# Patient Record
Sex: FEMALE | Race: BLACK OR AFRICAN AMERICAN
[De-identification: names, ages, dates, MRNs, and addresses within clinical notes are randomized per-mention and may not be internally consistent; named-entity substitution may affect disease eponyms.]

---

## 2019-04-15 ENCOUNTER — HOSPITAL ENCOUNTER (INPATIENT)
Dept: HOSPITAL 61 - ER | Age: 84
LOS: 1 days | Discharge: HOME | DRG: 191 | End: 2019-04-16
Attending: INTERNAL MEDICINE | Admitting: INTERNAL MEDICINE
Payer: COMMERCIAL

## 2019-04-15 VITALS — DIASTOLIC BLOOD PRESSURE: 95 MMHG | SYSTOLIC BLOOD PRESSURE: 204 MMHG

## 2019-04-15 VITALS — DIASTOLIC BLOOD PRESSURE: 105 MMHG | SYSTOLIC BLOOD PRESSURE: 141 MMHG

## 2019-04-15 VITALS — HEIGHT: 63 IN | BODY MASS INDEX: 33.86 KG/M2 | WEIGHT: 191.12 LBS

## 2019-04-15 DIAGNOSIS — Z88.1: ICD-10-CM

## 2019-04-15 DIAGNOSIS — I45.10: ICD-10-CM

## 2019-04-15 DIAGNOSIS — I25.10: ICD-10-CM

## 2019-04-15 DIAGNOSIS — J44.1: Primary | ICD-10-CM

## 2019-04-15 DIAGNOSIS — I10: ICD-10-CM

## 2019-04-15 DIAGNOSIS — Z90.710: ICD-10-CM

## 2019-04-15 DIAGNOSIS — E44.0: ICD-10-CM

## 2019-04-15 DIAGNOSIS — E78.5: ICD-10-CM

## 2019-04-15 DIAGNOSIS — K44.9: ICD-10-CM

## 2019-04-15 DIAGNOSIS — Z88.8: ICD-10-CM

## 2019-04-15 DIAGNOSIS — K57.90: ICD-10-CM

## 2019-04-15 DIAGNOSIS — I25.2: ICD-10-CM

## 2019-04-15 DIAGNOSIS — M19.90: ICD-10-CM

## 2019-04-15 DIAGNOSIS — Z90.49: ICD-10-CM

## 2019-04-15 DIAGNOSIS — K21.9: ICD-10-CM

## 2019-04-15 LAB
ALBUMIN SERPL-MCNC: 3.3 G/DL (ref 3.4–5)
ALBUMIN/GLOB SERPL: 0.8 {RATIO} (ref 1–1.7)
ALP SERPL-CCNC: 60 U/L (ref 46–116)
ALT SERPL-CCNC: 11 U/L (ref 14–59)
ANION GAP SERPL CALC-SCNC: 8 MMOL/L (ref 6–14)
APTT PPP: YELLOW S
AST SERPL-CCNC: 22 U/L (ref 15–37)
BACTERIA #/AREA URNS HPF: 0 /HPF
BASOPHILS # BLD AUTO: 0.1 X10^3/UL (ref 0–0.2)
BASOPHILS NFR BLD: 1 % (ref 0–3)
BILIRUB SERPL-MCNC: 0.3 MG/DL (ref 0.2–1)
BILIRUB UR QL STRIP: NEGATIVE
BUN SERPL-MCNC: 19 MG/DL (ref 7–20)
BUN/CREAT SERPL: 19 (ref 6–20)
CALCIUM SERPL-MCNC: 9.2 MG/DL (ref 8.5–10.1)
CHLORIDE SERPL-SCNC: 103 MMOL/L (ref 98–107)
CO2 SERPL-SCNC: 29 MMOL/L (ref 21–32)
CREAT SERPL-MCNC: 1 MG/DL (ref 0.6–1)
EOSINOPHIL NFR BLD: 0.3 X10^3/UL (ref 0–0.7)
EOSINOPHIL NFR BLD: 5 % (ref 0–3)
ERYTHROCYTE [DISTWIDTH] IN BLOOD BY AUTOMATED COUNT: 14.4 % (ref 11.5–14.5)
FIBRINOGEN PPP-MCNC: CLEAR MG/DL
GFR SERPLBLD BASED ON 1.73 SQ M-ARVRAT: 63.5 ML/MIN
GLOBULIN SER-MCNC: 4.1 G/DL (ref 2.2–3.8)
GLUCOSE SERPL-MCNC: 75 MG/DL (ref 70–99)
HCT VFR BLD CALC: 37.9 % (ref 36–47)
HGB BLD-MCNC: 12 G/DL (ref 12–15.5)
INFLUENZA A PATIENT: NEGATIVE
INFLUENZA B PATIENT: NEGATIVE
LIPASE: 188 U/L (ref 73–393)
LYMPHOCYTES # BLD: 1.3 X10^3/UL (ref 1–4.8)
LYMPHOCYTES NFR BLD AUTO: 21 % (ref 24–48)
MCH RBC QN AUTO: 26 PG (ref 25–35)
MCHC RBC AUTO-ENTMCNC: 32 G/DL (ref 31–37)
MCV RBC AUTO: 83 FL (ref 79–100)
MONO #: 0.5 X10^3/UL (ref 0–1.1)
MONOCYTES NFR BLD: 8 % (ref 0–9)
NEUT #: 4.1 X10^3UL (ref 1.8–7.7)
NEUTROPHILS NFR BLD AUTO: 65 % (ref 31–73)
NITRITE UR QL STRIP: NEGATIVE
PH UR STRIP: 6.5 [PH]
PLATELET # BLD AUTO: 208 X10^3/UL (ref 140–400)
POTASSIUM SERPL-SCNC: 3.6 MMOL/L (ref 3.5–5.1)
PROT SERPL-MCNC: 7.4 G/DL (ref 6.4–8.2)
PROT UR STRIP-MCNC: NEGATIVE MG/DL
RBC # BLD AUTO: 4.56 X10^6/UL (ref 3.5–5.4)
RBC #/AREA URNS HPF: 0 /HPF (ref 0–2)
SODIUM SERPL-SCNC: 140 MMOL/L (ref 136–145)
SQUAMOUS #/AREA URNS LPF: (no result) /LPF
UROBILINOGEN UR-MCNC: 0.2 MG/DL
WBC # BLD AUTO: 6.4 X10^3/UL (ref 4–11)
WBC #/AREA URNS HPF: 0 /HPF (ref 0–4)

## 2019-04-15 PROCEDURE — 93005 ELECTROCARDIOGRAM TRACING: CPT

## 2019-04-15 PROCEDURE — 87804 INFLUENZA ASSAY W/OPTIC: CPT

## 2019-04-15 PROCEDURE — 85025 COMPLETE CBC W/AUTO DIFF WBC: CPT

## 2019-04-15 PROCEDURE — 71045 X-RAY EXAM CHEST 1 VIEW: CPT

## 2019-04-15 PROCEDURE — 83690 ASSAY OF LIPASE: CPT

## 2019-04-15 PROCEDURE — 84484 ASSAY OF TROPONIN QUANT: CPT

## 2019-04-15 PROCEDURE — G0379 DIRECT REFER HOSPITAL OBSERV: HCPCS

## 2019-04-15 PROCEDURE — 85379 FIBRIN DEGRADATION QUANT: CPT

## 2019-04-15 PROCEDURE — 83735 ASSAY OF MAGNESIUM: CPT

## 2019-04-15 PROCEDURE — 84443 ASSAY THYROID STIM HORMONE: CPT

## 2019-04-15 PROCEDURE — 94640 AIRWAY INHALATION TREATMENT: CPT

## 2019-04-15 PROCEDURE — 74177 CT ABD & PELVIS W/CONTRAST: CPT

## 2019-04-15 PROCEDURE — 36415 COLL VENOUS BLD VENIPUNCTURE: CPT

## 2019-04-15 PROCEDURE — 83880 ASSAY OF NATRIURETIC PEPTIDE: CPT

## 2019-04-15 PROCEDURE — 96374 THER/PROPH/DIAG INJ IV PUSH: CPT

## 2019-04-15 PROCEDURE — 80053 COMPREHEN METABOLIC PANEL: CPT

## 2019-04-15 PROCEDURE — 80061 LIPID PANEL: CPT

## 2019-04-15 PROCEDURE — 71275 CT ANGIOGRAPHY CHEST: CPT

## 2019-04-15 PROCEDURE — 81001 URINALYSIS AUTO W/SCOPE: CPT

## 2019-04-15 RX ADMIN — IPRATROPIUM BROMIDE AND ALBUTEROL SULFATE SCH ML: .5; 3 SOLUTION RESPIRATORY (INHALATION) at 20:12

## 2019-04-15 NOTE — EKG
[facility]

              [address] [place] [address]

Test Date:    2019-04-15               Test Time:    14:34:01

Pat Name:     MED ROWLAND          Department:   

Patient ID:   [id number]           Room:          

Gender:       F                        Technician:   

:          [id number]               Requested By: ALBERT BARBER

Order Number: [id number]           Reading MD:   Wellington Roe

                                 Measurements

Intervals                              Axis          

Rate:         75                       P:            41

MO:           148                      QRS:          -66

QRSD:         140                      T:            16

QT:           418                                    

QTc:          470                                    

                           Interpretive Statements

SINUS RHYTHM

ABNORMAL LEFT AXIS DEVIATION

LEFT ANTERIOR FASCICULAR BLOCK

RIGHT BUNDLE BRANCH BLOCK

ABNORMAL ECG



Electronically Signed On 2019 13:00:19 CDT by Wellington Roe

## 2019-04-15 NOTE — RAD
PORTABLE CHEST 1V

 

Clinical indications: Chest pain, wheezing.

 

COMPARISON: December 11, 2017.

 

Findings: No acute lung infiltrate or pleural effusion or pulmonary edema 

or lung mass or pneumothorax is seen.  The heart size, pulmonary 

vasculature, mediastinum and both yonny are unremarkable. 

 

Impression: No acute radiographic abnormality is seen.

 

Electronically signed by: Meño Kim MD (4/15/2019 3:11 PM) 

[id number]

## 2019-04-15 NOTE — NUR
The patient, MED ROWLAND, 88 y/o, F admitted by MARIE FRAUSTO MD, was given written 
information regarding hospital policies, unit procedures and contact persons. ORIENTED TO 
ROOM, POC, APPLIED CARDIAC MONITOR AND HER ACTIVITY. PT IS TO CALL FOR ASSISTANCE TO GET OUT 
OF BED. BOOKLET FOR ADMITS WAS GIVEN AND EXPLAINED MED PHAMPLET AND FOOD MENUE. 



Valuables were checked and DOCUMENTED IN THE EMR. LCRN .

## 2019-04-15 NOTE — PHYS DOC
Past Medical History


Past Medical History:  Arthritis, Asthma, Diverticulitis, GI Bleed, Hypertension

, Pneumonia


Additional Past Medical Histor:  HERNIA, rectal bleed 08/15


Past Surgical History:  Cholecystectomy, Hysterectomy, Tubal ligation


Additional Past Surgical Histo:  heart cath, renal stents


Alcohol Use:  None


Drug Use:  None





Adult General


Chief Complaint


Chief Complaint:  COUGH





HPI


HPI





Patient is a 87  year old female history of COPD and hypertension presents to 

the ED complaining of cough 3 months. Patient states that her cough has been 

getting worse. States she was told at  that she had COPD. States she has mild 

chest pain also. History of negative stress test in 2015 and had a previous MI 

years before that. Describes the pain as sharp. Rates the pain as 6 out of 10. 

Associates symptoms include nausea and subjective fever. Denies lower leg 

swelling, abdominal pain, vomiting, dizziness, weakness, headache, neck pain.





Review of Systems


Review of Systems





Constitutional: Complains of subjective fever. Denies chills []


Eyes: Denies change in visual acuity, redness, or eye pain []


HENT: Complains of congestion. Denies sore throat []


Respiratory: Complains of cough. Denies shortness of breath []


Cardiovascular: No additional information not addressed in HPI []


GI: Denies abdominal pain, nausea, vomiting, bloody stools or diarrhea []


: Denies dysuria or hematuria []


Musculoskeletal: Denies back pain or joint pain []


Integument: Denies rash or skin lesions []


Neurologic: Denies headache, focal weakness or sensory changes []








All other systems were reviewed and found to be within normal limits, except as 

documented in this note.





Current Medications


Current Medications





Current Medications








 Medications


  (Trade)  Dose


 Ordered  Sig/Josette  Start Time


 Stop Time Status Last Admin


Dose Admin


 


 Albuterol/


 Ipratropium


  (Duoneb)  3 ml  1X  ONCE  4/15/19 15:45


 4/15/19 15:46 DC 4/15/19 15:55


3 ML


 


 Info


  (CONTRAST GIVEN


 -- Rx MONITORING)  1 each  PRN DAILY  PRN  4/15/19 16:45


 4/17/19 16:44   





 


 Iohexol


  (Omnipaque 300


 Mg/ml)  100 ml  1X  ONCE  4/15/19 16:00


 4/15/19 16:02 DC  





 


 Iohexol


  (Omnipaque 350


 Mg/ml)  100 ml  1X  ONCE  4/15/19 16:45


 4/15/19 16:46 DC  





 


 Methylprednisolone


 Sodium Succinate


  (SOLU-Medrol


 125MG VIAL)  125 mg  1X  ONCE  4/15/19 15:45


 4/15/19 15:48 DC 4/15/19 16:07


125 MG











Allergies


Allergies





Allergies








Coded Allergies Type Severity Reaction Last Updated Verified


 


  amlodipine Allergy Intermediate  12/11/17 Yes


 


  dexlansoprazole Allergy Intermediate  12/11/17 Yes


 


  fluticasone Allergy Intermediate Uses ADVAIR at home 1/30/15 Yes


 


  levofloxacin Allergy Intermediate  12/11/17 Yes


 


  rabeprazole Allergy Intermediate  12/11/17 Yes


 


  ranitidine Allergy Intermediate  12/11/17 Yes











Physical Exam


Physical Exam





Constitutional: Well developed, well nourished, no acute distress, non-toxic 

appearance. []


HENT: Normocephalic, atraumatic, bilateral external ears normal, oropharynx 

moist, no oral exudates, nose normal. []


Eyes: PERRLA, EOMI, conjunctiva normal, no discharge. [] 


Neck: Normal range of motion, no tenderness, supple, no stridor. [] 


Cardiovascular:Heart rate regular rhythm, no murmur []


Lungs & Thorax:  Bilateral wheezing. []


Abdomen: Bowel sounds normal, soft, no tenderness, no masses, no pulsatile 

masses. [] 


Skin: Warm, dry, no erythema, no rash. [] 


Back: No tenderness, no CVA tenderness. [] 


Extremities: No tenderness, no cyanosis, no clubbing, ROM intact, no edema. [] 


Neurologic: Alert and oriented X 3, normal motor function, normal sensory 

function, no focal deficits noted. []


Psychologic: Affect normal, judgement normal, mood normal. []





Current Patient Data


Vital Signs





 Vital Signs








  Date Time  Temp Pulse Resp B/P (MAP) Pulse Ox O2 Delivery O2 Flow Rate FiO2


 


4/15/19 17:00  68   98 Room Air  


 


4/15/19 16:00   19 180/86 (117)    


 


4/15/19 14:33 98.1       





 98.1       








Lab Values





 Laboratory Tests








Test


 4/15/19


14:28 4/15/19


15:10 4/15/19


15:15 4/15/19


15:39


 


White Blood Count


 6.4 x10^3/uL


(4.0-11.0) 


 


 





 


Red Blood Count


 4.56 x10^6/uL


(3.50-5.40) 


 


 





 


Hemoglobin


 12.0 g/dL


(12.0-15.5) 


 


 





 


Hematocrit


 37.9 %


(36.0-47.0) 


 


 





 


Mean Corpuscular Volume


 83 fL ()


 


 


 





 


Mean Corpuscular Hemoglobin 26 pg (25-35)     


 


Mean Corpuscular Hemoglobin


Concent 32 g/dL


(31-37) 


 


 





 


Red Cell Distribution Width


 14.4 %


(11.5-14.5) 


 


 





 


Platelet Count


 208 x10^3/uL


(140-400) 


 


 





 


Neutrophils (%) (Auto) 65 % (31-73)     


 


Lymphocytes (%) (Auto) 21 % (24-48)  L   


 


Monocytes (%) (Auto) 8 % (0-9)     


 


Eosinophils (%) (Auto) 5 % (0-3)  H   


 


Basophils (%) (Auto) 1 % (0-3)     


 


Neutrophils # (Auto)


 4.1 x10^3uL


(1.8-7.7) 


 


 





 


Lymphocytes # (Auto)


 1.3 x10^3/uL


(1.0-4.8) 


 


 





 


Monocytes # (Auto)


 0.5 x10^3/uL


(0.0-1.1) 


 


 





 


Eosinophils # (Auto)


 0.3 x10^3/uL


(0.0-0.7) 


 


 





 


Basophils # (Auto)


 0.1 x10^3/uL


(0.0-0.2) 


 


 





 


Sodium Level


 140 mmol/L


(136-145) 


 


 





 


Potassium Level


 3.6 mmol/L


(3.5-5.1) 


 


 





 


Chloride Level


 103 mmol/L


() 


 


 





 


Carbon Dioxide Level


 29 mmol/L


(21-32) 


 


 





 


Anion Gap 8 (6-14)     


 


Blood Urea Nitrogen


 19 mg/dL


(7-20) 


 


 





 


Creatinine


 1.0 mg/dL


(0.6-1.0) 


 


 





 


Estimated GFR


(Cockcroft-Gault) 63.5  


 


 


 





 


BUN/Creatinine Ratio 19 (6-20)     


 


Glucose Level


 75 mg/dL


(70-99) 


 


 





 


Calcium Level


 9.2 mg/dL


(8.5-10.1) 


 


 





 


Total Bilirubin


 0.3 mg/dL


(0.2-1.0) 


 


 





 


Aspartate Amino Transferase


(AST) 22 U/L (15-37)


 


 


 





 


Alanine Aminotransferase (ALT)


 11 U/L (14-59)


L 


 


 





 


Alkaline Phosphatase


 60 U/L


() 


 


 





 


Troponin I Quantitative


 < 0.017 ng/mL


(0.000-0.055) 


 


 





 


NT-Pro-B-Type Natriuretic


Peptide 429 pg/mL


(0-449) 


 


 





 


Total Protein


 7.4 g/dL


(6.4-8.2) 


 


 





 


Albumin


 3.3 g/dL


(3.4-5.0)  L 


 


 





 


Albumin/Globulin Ratio


 0.8 (1.0-1.7)


L 


 


 





 


Lipase


 188 U/L


() 


 


 





 


D-Dimer (Myra)


 


 1.85 ug/mlFEU


(0.00-0.50)  H 


 





 


Urine Collection Type   Unknown   


 


Urine Color   Yellow   


 


Urine Clarity   Clear   


 


Urine pH   6.5   


 


Urine Specific Gravity   <=1.005   


 


Urine Protein


 


 


 Negative mg/dL


(NEG-TRACE) 





 


Urine Glucose (UA)


 


 


 Negative mg/dL


(NEG) 





 


Urine Ketones (Stick)


 


 


 Negative mg/dL


(NEG) 





 


Urine Blood


 


 


 Negative (NEG)


 





 


Urine Nitrite


 


 


 Negative (NEG)


 





 


Urine Bilirubin


 


 


 Negative (NEG)


 





 


Urine Urobilinogen Dipstick


 


 


 0.2 mg/dL (0.2


mg/dL) 





 


Urine Leukocyte Esterase   Small (NEG)   


 


Urine RBC   0 /HPF (0-2)   


 


Urine WBC   0 /HPF (0-4)   


 


Urine Squamous Epithelial


Cells 


 


 Few /LPF  


 





 


Urine Bacteria


 


 


 0 /HPF (0-FEW)


 





 


Influenza Type A Antigen


 


 


 


 Negative


(NEGATIVE)


 


Influenza Type B Antigen


 


 


 


 Negative


(NEGATIVE)





 Laboratory Tests


4/15/19 14:28








 Laboratory Tests


4/15/19 14:28











EKG


EKG


[]





Radiology/Procedures


Radiology/Procedures


PROCEDURE: CT ANGIOGRAPHY CHEST





Examination: CT ANGIOGRAPHY CHEST


 


History: CHEST DISCOMFORT, SOB, COUGH X MONTHS<BR>IV OMNI 350 100 


MLS<BR>PREVIOUS


 


Comparison/Correlation: 12/11/2017 CTA chest abdomen pelvis


 


Findings: Axial images of chest were obtained following IV contrast 


according to pulmonary arteriography protocol. MIP images provided. 


Sagittal and coronal reformatted images provided.


 


Pulmonary arterial vasculature is normal with no thromboembolic disease. 


No infiltrates. No pulmonary nodule or mass lesions. Mild bilateral lower 


lobe bronchiectasis. Minimal lingular bronchiectasis. Calcified granuloma 


involving the anterior left lung base. Thoracic aorta is unremarkable with


patient's age. Multilevel degenerative disc space narrowing and endplate 


sclerosis involving the thoracic and upper lumbar spine noted. No enlarged


thoracic lymph nodes. Cholecystectomy. Small hiatal hernia noted. 50% 


stenosis of the celiac artery origin noted. Left main renal arterial stent


is present.


 


 


Impression:


No pulmonary arterial thromboembolic disease. No infiltrate.[]





Course & Med Decision Making


Course & Med Decision Making


Pertinent Labs and Imaging studies reviewed. (See chart for details)





Patient complaining of chest pain and shortness of breath on re-examination. 

Patient has a history of asthma/COPD. She has breathing treatments at home but 

states they've not been helping. Patient not requiring oxygen via nasal cannula 

at this time. States she is feeling better after the first breathing treatment. 

Patient given Solu-Medrol in the ED. First troponin and CTA negative for PE. 

Will admit for COPD exacerbation/chest pain. 





[]Discussed lab and imaging findings with hospitalist, Dr. Waters. Agrees to 

admission and further management patient. Patient stable for admission.





Dragon Disclaimer


Dragon Disclaimer


This electronic medical record was generated, in whole or in part, using a 

voice recognition dictation system.





Departure


Departure


Impression:  


 Primary Impression:  


 COPD with exacerbation


 Additional Impression:  


 Chest pain


Disposition:  09 ADMITTED AS INPATIENT


Admitting Physician:  Other (Jt)


Condition:  STABLE


Referrals:  


VANESSA CONNORS (PCP)





Problem Qualifiers











ALBERT BARBER Apr 15, 2019 14:50

## 2019-04-15 NOTE — RAD
Examination: CT ANGIOGRAPHY CHEST

 

History: CHEST DISCOMFORT, SOB, COUGH X MONTHS<BR>IV OMNI 350 100 

MLS<BR>PREVIOUS

 

Comparison/Correlation: 12/11/2017 CTA chest abdomen pelvis

 

Findings: Axial images of chest were obtained following IV contrast 

according to pulmonary arteriography protocol. MIP images provided. 

Sagittal and coronal reformatted images provided.

 

Pulmonary arterial vasculature is normal with no thromboembolic disease. 

No infiltrates. No pulmonary nodule or mass lesions. Mild bilateral lower 

lobe bronchiectasis. Minimal lingular bronchiectasis. Calcified granuloma 

involving the anterior left lung base. Thoracic aorta is unremarkable with

patient's age. Multilevel degenerative disc space narrowing and endplate 

sclerosis involving the thoracic and upper lumbar spine noted. No enlarged

thoracic lymph nodes. Cholecystectomy. Small hiatal hernia noted. 50% 

stenosis of the celiac artery origin noted. Left main renal arterial stent

is present.

 

 

Impression:

No pulmonary arterial thromboembolic disease. No infiltrate.

 

 

 

 

PQRS Compliance Statement:

 

One or more of the following individualized dose reduction techniques were

utilized for this examination:  

1. Automated exposure control  

2. Adjustment of the mA and/or kV according to patient size  

3. Use of iterative reconstruction technique

 

Electronically signed by: Chadwick Cabral MD (4/15/2019 4:38 PM) [id number]

## 2019-04-16 VITALS — SYSTOLIC BLOOD PRESSURE: 158 MMHG | DIASTOLIC BLOOD PRESSURE: 76 MMHG

## 2019-04-16 VITALS — SYSTOLIC BLOOD PRESSURE: 146 MMHG | DIASTOLIC BLOOD PRESSURE: 90 MMHG

## 2019-04-16 VITALS — SYSTOLIC BLOOD PRESSURE: 130 MMHG | DIASTOLIC BLOOD PRESSURE: 79 MMHG

## 2019-04-16 LAB
ALBUMIN SERPL-MCNC: 3.1 G/DL (ref 3.4–5)
ALBUMIN/GLOB SERPL: 0.7 {RATIO} (ref 1–1.7)
ALP SERPL-CCNC: 64 U/L (ref 46–116)
ALT SERPL-CCNC: 15 U/L (ref 14–59)
ANION GAP SERPL CALC-SCNC: 10 MMOL/L (ref 6–14)
AST SERPL-CCNC: 21 U/L (ref 15–37)
BASOPHILS # BLD AUTO: 0 X10^3/UL (ref 0–0.2)
BASOPHILS NFR BLD: 1 % (ref 0–3)
BILIRUB SERPL-MCNC: 0.2 MG/DL (ref 0.2–1)
BUN SERPL-MCNC: 18 MG/DL (ref 7–20)
BUN/CREAT SERPL: 18 (ref 6–20)
CALCIUM SERPL-MCNC: 9.1 MG/DL (ref 8.5–10.1)
CHLORIDE SERPL-SCNC: 104 MMOL/L (ref 98–107)
CHOLEST SERPL-MCNC: 254 MG/DL (ref 0–200)
CHOLEST/HDLC SERPL: 3.6 {RATIO}
CO2 SERPL-SCNC: 26 MMOL/L (ref 21–32)
CREAT SERPL-MCNC: 1 MG/DL (ref 0.6–1)
EOSINOPHIL NFR BLD: 0 % (ref 0–3)
EOSINOPHIL NFR BLD: 0 X10^3/UL (ref 0–0.7)
ERYTHROCYTE [DISTWIDTH] IN BLOOD BY AUTOMATED COUNT: 14.5 % (ref 11.5–14.5)
GFR SERPLBLD BASED ON 1.73 SQ M-ARVRAT: 63.5 ML/MIN
GLOBULIN SER-MCNC: 4.3 G/DL (ref 2.2–3.8)
GLUCOSE SERPL-MCNC: 134 MG/DL (ref 70–99)
HCT VFR BLD CALC: 38.1 % (ref 36–47)
HDLC SERPL-MCNC: 71 MG/DL (ref 40–60)
HGB BLD-MCNC: 12.1 G/DL (ref 12–15.5)
LDLC: 176 MG/DL (ref 0–100)
LYMPHOCYTES # BLD: 0.6 X10^3/UL (ref 1–4.8)
LYMPHOCYTES NFR BLD AUTO: 13 % (ref 24–48)
MCH RBC QN AUTO: 26 PG (ref 25–35)
MCHC RBC AUTO-ENTMCNC: 32 G/DL (ref 31–37)
MCV RBC AUTO: 83 FL (ref 79–100)
MONO #: 0.1 X10^3/UL (ref 0–1.1)
MONOCYTES NFR BLD: 2 % (ref 0–9)
NEUT #: 4 X10^3UL (ref 1.8–7.7)
NEUTROPHILS NFR BLD AUTO: 84 % (ref 31–73)
PLATELET # BLD AUTO: 201 X10^3/UL (ref 140–400)
POTASSIUM SERPL-SCNC: 4 MMOL/L (ref 3.5–5.1)
PROT SERPL-MCNC: 7.4 G/DL (ref 6.4–8.2)
RBC # BLD AUTO: 4.59 X10^6/UL (ref 3.5–5.4)
SODIUM SERPL-SCNC: 140 MMOL/L (ref 136–145)
TRIGL SERPL-MCNC: 34 MG/DL (ref 0–150)
VLDLC: 7 MG/DL (ref 0–40)
WBC # BLD AUTO: 4.7 X10^3/UL (ref 4–11)

## 2019-04-16 RX ADMIN — IPRATROPIUM BROMIDE AND ALBUTEROL SULFATE SCH ML: .5; 3 SOLUTION RESPIRATORY (INHALATION) at 11:43

## 2019-04-16 RX ADMIN — IPRATROPIUM BROMIDE AND ALBUTEROL SULFATE SCH ML: .5; 3 SOLUTION RESPIRATORY (INHALATION) at 15:57

## 2019-04-16 RX ADMIN — IPRATROPIUM BROMIDE AND ALBUTEROL SULFATE SCH ML: .5; 3 SOLUTION RESPIRATORY (INHALATION) at 07:43

## 2019-04-16 NOTE — NUR
SS following for discharge planning. SS reviewed pt chart. Pt is from home and is currently 
on room air. No discharge needs noted at this time. SS will continue to follow for pending 
discharge needs.

## 2019-04-16 NOTE — NUR
Discharge Note:



TR ROWLAND



Discharge instructions and discharge home medications reviewed with Patient and a copy 
given. All questions have been answered and understanding verbalized.

## 2019-04-16 NOTE — RAD
CT of the abdomen and pelvis with contrast, 4/16/2019:

 

HISTORY: Abdominal pain

 

Multidetector CT imaging was performed following oral or oral and IV 

injection of contrast. There was reportedly leakage of contrast at the 

injection site. Little if any of the IV contrast is evident in the 

vascular system on these images. Due to abundant intra-abdominal fat the 

abdominal structures are fairly well defined even in the absence of 

vascular contrast enhancement. We therefore did not repeat this study at 

this time.

 

Mild bronchiectasis is present in the lung bases.

 

The gallbladder is surgically absent. There are calcified granulomata in 

the liver and spleen. No hepatic mass is evident. The pancreas is 

unremarkable. The spleen is of normal size. There appears to be a small 

cyst in the posterior aspect of the left kidney. The kidneys show no 

evidence of obstruction. No adrenal abnormality is detected.

 

Moderate aortoiliac calcific plaquing is present. There is slight 

dilatation of the infrarenal abdominal aorta which measures 2.7 cm in 

greatest AP dimension. A stent is evident in the left main renal artery. 

No abdominal or pelvic adenopathy is seen. The uterus is surgically 

absent. There is contrast material in the urinary bladder from yesterday's

CT angiogram.

 

Colonic diverticulosis is present, most extensive in the sigmoid region. 

No paracolonic inflammatory process is seen. The bowel loops are not 

dilated. A small hiatal hernia is noted. No free air or free fluid is 

evident in the abdomen or pelvis.

 

There is diastases and thinning of the rectus abdominis musculature with 

anterior bulging of the intervening fascia. A small fat-containing 

umbilical hernia is evident.

 

Moderate multilevel degenerative changes are present in the lumbar and 

lower thoracic spine.

 

IMPRESSION:

1. Suboptimal exam as described above.

2. Colonic diverticulosis.

3. Small hiatal hernia.

4. Small fat-containing umbilical hernia.

5. Left renal cyst.

6. Mild bibasilar bronchiectasis.

7. No acute intra-abdominal or pelvic abnormality is detected.

 

 

PQRS Compliance Statement:

 

One or more of the following individualized dose reduction techniques were

utilized for this examination:

1. Automated exposure control

2. Adjustment of the mA and/or kV according to patient size

3. Use of iterative reconstruction technique

 

Electronically signed by: Rick Moritz, MD (4/16/2019 3:05 PM) [facility]

## 2019-04-16 NOTE — PDOC3
Discharge Summary


Visit Information


Date of Admission:  Apr 16, 2019


Date of Discharge:  Apr 16, 2019


Final Diagnosis


Problems


Medical Problems:


(1) Chest pain


Status: Acute  





(2) COPD with exacerbation


Status: Acute  











Brief Hospital Course


Allergies





 Allergies








Coded Allergies Type Severity Reaction Last Updated Verified


 


  amlodipine Allergy Intermediate  12/11/17 Yes


 


  dexlansoprazole Allergy Intermediate  12/11/17 Yes


 


  fluticasone Allergy Intermediate Uses ADVAIR at home 1/30/15 Yes


 


  levofloxacin Allergy Intermediate  12/11/17 Yes


 


  rabeprazole Allergy Intermediate  12/11/17 Yes


 


  ranitidine Allergy Intermediate  12/11/17 Yes








Vital Signs





Vital Signs








  Date Time  Temp Pulse Resp B/P (MAP) Pulse Ox O2 Delivery O2 Flow Rate FiO2


 


4/16/19 15:58     98 Room Air  


 


4/16/19 15:00 97.9 62 18 158/76 (103)    





 97.9       








Lab Results





Laboratory Tests








Test


 4/15/19


14:28 4/15/19


15:10 4/15/19


15:15 4/15/19


15:39


 


White Blood Count


 6.4 x10^3/uL


(4.0-11.0) 


 


 





 


Red Blood Count


 4.56 x10^6/uL


(3.50-5.40) 


 


 





 


Hemoglobin


 12.0 g/dL


(12.0-15.5) 


 


 





 


Hematocrit


 37.9 %


(36.0-47.0) 


 


 





 


Mean Corpuscular Volume 83 fL ()    


 


Mean Corpuscular Hemoglobin 26 pg (25-35)    


 


Mean Corpuscular Hemoglobin


Concent 32 g/dL


(31-37) 


 


 





 


Red Cell Distribution Width


 14.4 %


(11.5-14.5) 


 


 





 


Platelet Count


 208 x10^3/uL


(140-400) 


 


 





 


Neutrophils (%) (Auto) 65 % (31-73)    


 


Lymphocytes (%) (Auto) 21 % (24-48)    


 


Monocytes (%) (Auto) 8 % (0-9)    


 


Eosinophils (%) (Auto) 5 % (0-3)    


 


Basophils (%) (Auto) 1 % (0-3)    


 


Neutrophils # (Auto)


 4.1 x10^3uL


(1.8-7.7) 


 


 





 


Lymphocytes # (Auto)


 1.3 x10^3/uL


(1.0-4.8) 


 


 





 


Monocytes # (Auto)


 0.5 x10^3/uL


(0.0-1.1) 


 


 





 


Eosinophils # (Auto)


 0.3 x10^3/uL


(0.0-0.7) 


 


 





 


Basophils # (Auto)


 0.1 x10^3/uL


(0.0-0.2) 


 


 





 


Sodium Level


 140 mmol/L


(136-145) 


 


 





 


Potassium Level


 3.6 mmol/L


(3.5-5.1) 


 


 





 


Chloride Level


 103 mmol/L


() 


 


 





 


Carbon Dioxide Level


 29 mmol/L


(21-32) 


 


 





 


Anion Gap 8 (6-14)    


 


Blood Urea Nitrogen


 19 mg/dL


(7-20) 


 


 





 


Creatinine


 1.0 mg/dL


(0.6-1.0) 


 


 





 


Estimated GFR


(Cockcroft-Gault) 63.5 


 


 


 





 


BUN/Creatinine Ratio 19 (6-20)    


 


Glucose Level


 75 mg/dL


(70-99) 


 


 





 


Calcium Level


 9.2 mg/dL


(8.5-10.1) 


 


 





 


Total Bilirubin


 0.3 mg/dL


(0.2-1.0) 


 


 





 


Aspartate Amino Transf


(AST/SGOT) 22 U/L (15-37) 


 


 


 





 


Alanine Aminotransferase


(ALT/SGPT) 11 U/L (14-59) 


 


 


 





 


Alkaline Phosphatase


 60 U/L


() 


 


 





 


Troponin I Quantitative


 < 0.017 ng/mL


(0.000-0.055) 


 


 





 


NT-Pro-B-Type Natriuretic


Peptide 429 pg/mL


(0-449) 


 


 





 


Total Protein


 7.4 g/dL


(6.4-8.2) 


 


 





 


Albumin


 3.3 g/dL


(3.4-5.0) 


 


 





 


Albumin/Globulin Ratio 0.8 (1.0-1.7)    


 


Lipase


 188 U/L


() 


 


 





 


D-Dimer (Myra)


 


 1.85 ug/mlFEU


(0.00-0.50) 


 





 


Urine Collection Type   Unknown  


 


Urine Color   Yellow  


 


Urine Clarity   Clear  


 


Urine pH   6.5  


 


Urine Specific Gravity   <=1.005  


 


Urine Protein


 


 


 Negative mg/dL


(NEG-TRACE) 





 


Urine Glucose (UA)


 


 


 Negative mg/dL


(NEG) 





 


Urine Ketones (Stick)


 


 


 Negative mg/dL


(NEG) 





 


Urine Blood   Negative (NEG)  


 


Urine Nitrite   Negative (NEG)  


 


Urine Bilirubin   Negative (NEG)  


 


Urine Urobilinogen Dipstick


 


 


 0.2 mg/dL (0.2


mg/dL) 





 


Urine Leukocyte Esterase   Small (NEG)  


 


Urine RBC   0 /HPF (0-2)  


 


Urine WBC   0 /HPF (0-4)  


 


Urine Squamous Epithelial


Cells 


 


 Few /LPF 


 





 


Urine Bacteria   0 /HPF (0-FEW)  


 


Influenza Type A Antigen


 


 


 


 Negative


(NEGATIVE)


 


Influenza Type B Antigen


 


 


 


 Negative


(NEGATIVE)


 


Test


 4/15/19


23:50 4/16/19


04:00 


 





 


Troponin I Quantitative


 < 0.017 ng/mL


(0.000-0.055) 


 


 





 


White Blood Count


 


 4.7 x10^3/uL


(4.0-11.0) 


 





 


Red Blood Count


 


 4.59 x10^6/uL


(3.50-5.40) 


 





 


Hemoglobin


 


 12.1 g/dL


(12.0-15.5) 


 





 


Hematocrit


 


 38.1 %


(36.0-47.0) 


 





 


Mean Corpuscular Volume  83 fL ()   


 


Mean Corpuscular Hemoglobin  26 pg (25-35)   


 


Mean Corpuscular Hemoglobin


Concent 


 32 g/dL


(31-37) 


 





 


Red Cell Distribution Width


 


 14.5 %


(11.5-14.5) 


 





 


Platelet Count


 


 201 x10^3/uL


(140-400) 


 





 


Neutrophils (%) (Auto)  84 % (31-73)   


 


Lymphocytes (%) (Auto)  13 % (24-48)   


 


Monocytes (%) (Auto)  2 % (0-9)   


 


Eosinophils (%) (Auto)  0 % (0-3)   


 


Basophils (%) (Auto)  1 % (0-3)   


 


Neutrophils # (Auto)


 


 4.0 x10^3uL


(1.8-7.7) 


 





 


Lymphocytes # (Auto)


 


 0.6 x10^3/uL


(1.0-4.8) 


 





 


Monocytes # (Auto)


 


 0.1 x10^3/uL


(0.0-1.1) 


 





 


Eosinophils # (Auto)


 


 0.0 x10^3/uL


(0.0-0.7) 


 





 


Basophils # (Auto)


 


 0.0 x10^3/uL


(0.0-0.2) 


 





 


Sodium Level


 


 140 mmol/L


(136-145) 


 





 


Potassium Level


 


 4.0 mmol/L


(3.5-5.1) 


 





 


Chloride Level


 


 104 mmol/L


() 


 





 


Carbon Dioxide Level


 


 26 mmol/L


(21-32) 


 





 


Anion Gap  10 (6-14)   


 


Blood Urea Nitrogen


 


 18 mg/dL


(7-20) 


 





 


Creatinine


 


 1.0 mg/dL


(0.6-1.0) 


 





 


Estimated GFR


(Cockcroft-Gault) 


 63.5 


 


 





 


BUN/Creatinine Ratio  18 (6-20)   


 


Glucose Level


 


 134 mg/dL


(70-99) 


 





 


Calcium Level


 


 9.1 mg/dL


(8.5-10.1) 


 





 


Magnesium Level


 


 2.2 mg/dL


(1.8-2.4) 


 





 


Total Bilirubin


 


 0.2 mg/dL


(0.2-1.0) 


 





 


Aspartate Amino Transf


(AST/SGOT) 


 21 U/L (15-37) 


 


 





 


Alanine Aminotransferase


(ALT/SGPT) 


 15 U/L (14-59) 


 


 





 


Alkaline Phosphatase


 


 64 U/L


() 


 





 


Total Protein


 


 7.4 g/dL


(6.4-8.2) 


 





 


Albumin


 


 3.1 g/dL


(3.4-5.0) 


 





 


Albumin/Globulin Ratio  0.7 (1.0-1.7)   


 


Triglycerides Level


 


 34 mg/dL


(0-150) 


 





 


Cholesterol Level


 


 254 mg/dL


(0-200) 


 





 


LDL Cholesterol, Calculated


 


 176 mg/dL


(0-100) 


 





 


VLDL Cholesterol, Calculated  7 mg/dL (0-40)   


 


Non-HDL Cholesterol Calculated


 


 183 mg/dL


(0-129) 


 





 


HDL Cholesterol


 


 71 mg/dL


(40-60) 


 





 


Cholesterol/HDL Ratio  3.6   


 


Thyroid Stimulating Hormone


(TSH) 


 0.584 uIU/mL


(0.358-3.74) 


 











Laboratory Tests








Test


 4/15/19


23:50 4/16/19


04:00


 


Troponin I Quantitative


 < 0.017 ng/mL


(0.000-0.055) 





 


White Blood Count


 


 4.7 x10^3/uL


(4.0-11.0)


 


Red Blood Count


 


 4.59 x10^6/uL


(3.50-5.40)


 


Hemoglobin


 


 12.1 g/dL


(12.0-15.5)


 


Hematocrit


 


 38.1 %


(36.0-47.0)


 


Mean Corpuscular Volume  83 fL () 


 


Mean Corpuscular Hemoglobin  26 pg (25-35) 


 


Mean Corpuscular Hemoglobin


Concent 


 32 g/dL


(31-37)


 


Red Cell Distribution Width


 


 14.5 %


(11.5-14.5)


 


Platelet Count


 


 201 x10^3/uL


(140-400)


 


Neutrophils (%) (Auto)  84 % (31-73) 


 


Lymphocytes (%) (Auto)  13 % (24-48) 


 


Monocytes (%) (Auto)  2 % (0-9) 


 


Eosinophils (%) (Auto)  0 % (0-3) 


 


Basophils (%) (Auto)  1 % (0-3) 


 


Neutrophils # (Auto)


 


 4.0 x10^3uL


(1.8-7.7)


 


Lymphocytes # (Auto)


 


 0.6 x10^3/uL


(1.0-4.8)


 


Monocytes # (Auto)


 


 0.1 x10^3/uL


(0.0-1.1)


 


Eosinophils # (Auto)


 


 0.0 x10^3/uL


(0.0-0.7)


 


Basophils # (Auto)


 


 0.0 x10^3/uL


(0.0-0.2)


 


Sodium Level


 


 140 mmol/L


(136-145)


 


Potassium Level


 


 4.0 mmol/L


(3.5-5.1)


 


Chloride Level


 


 104 mmol/L


()


 


Carbon Dioxide Level


 


 26 mmol/L


(21-32)


 


Anion Gap  10 (6-14) 


 


Blood Urea Nitrogen


 


 18 mg/dL


(7-20)


 


Creatinine


 


 1.0 mg/dL


(0.6-1.0)


 


Estimated GFR


(Cockcroft-Gault) 


 63.5 





 


BUN/Creatinine Ratio  18 (6-20) 


 


Glucose Level


 


 134 mg/dL


(70-99)


 


Calcium Level


 


 9.1 mg/dL


(8.5-10.1)


 


Magnesium Level


 


 2.2 mg/dL


(1.8-2.4)


 


Total Bilirubin


 


 0.2 mg/dL


(0.2-1.0)


 


Aspartate Amino Transf


(AST/SGOT) 


 21 U/L (15-37) 





 


Alanine Aminotransferase


(ALT/SGPT) 


 15 U/L (14-59) 





 


Alkaline Phosphatase


 


 64 U/L


()


 


Total Protein


 


 7.4 g/dL


(6.4-8.2)


 


Albumin


 


 3.1 g/dL


(3.4-5.0)


 


Albumin/Globulin Ratio  0.7 (1.0-1.7) 


 


Triglycerides Level


 


 34 mg/dL


(0-150)


 


Cholesterol Level


 


 254 mg/dL


(0-200)


 


LDL Cholesterol, Calculated


 


 176 mg/dL


(0-100)


 


VLDL Cholesterol, Calculated  7 mg/dL (0-40) 


 


Non-HDL Cholesterol Calculated


 


 183 mg/dL


(0-129)


 


HDL Cholesterol


 


 71 mg/dL


(40-60)


 


Cholesterol/HDL Ratio  3.6 


 


Thyroid Stimulating Hormone


(TSH) 


 0.584 uIU/mL


(0.358-3.74)








Brief Hospital Course


88 y/o female with hx of COPD, HTN with multitude of complaints: sob, cough-non-

productive, and diffuse abdominal pain. states sob and cough have been present 

for many months. Also Reports of sharp chest pain to left that is reproducible 

with palpation and exacerbated by coughing and on deep inspiration.  History of 

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 patient's 

main concern is abdominal pain and frustrated that people keep asking her about 

her chest pain. No diarrhea or vomiting. no  fever. Denies lower leg swelling, 

abdominal pain, vomiting, dizziness, weakness, headache, neck pain. In the ER 

patient found to be wheezing and given solumedrol, breathing treatments and 

admitted to hospitalist service .





patient admitted to a medical floor and started on steroids. however patient 

stated steroids too high and doesn't want steroids. she improved on breathing 

treatments. cards consulted for chest pain, likely non-cardiac. echo and 

outpatient stress recommended but patient did not want. patient was continued 

on all her home medications. she was complaining of abdominal pain and CT 

abdomen was ordered and no acute pathology identified. patient was able to 

ambulate in room without sob. she is requesting to be discharged. no new meds 

given. recommend outpatient follow up with PCP. patient discharged in stable 

condition.





Discharge Information


Condition at Discharge:  Improved


Follow Up:  Weeks


Disposition/Orders:  D/C to Home


Scheduled


Aspirin (Aspirin Ec) 81 Mg Tablet.dr, 1 TAB PO HS for THINNER, #30 Ref 3 (

Reported)


   Entered as Reported by: Cassandra Meyer on 4/16/19 0322


   Last Action: Continued on 4/16/19 1044 by SINA AGUILAR


Famotidine (Pepcid) 20 Mg Tablet, 20 MG PO HS for gerd, (Reported)


   Entered as Reported by: Cassandra Meyer on 4/15/19 2048


   Last Action: Continued on 4/16/19 1044 by SINA AGUILAR


Flaxseed/Omega3,6,9/Fatty Acid (Flax Seed Oil 1,300 Mg Softgel) 1 Each Capsule, 

1 EACH PO DAILY for SUPPLEMENT, (Reported)


   Entered as Reported by: Cassandra Meyer on 4/16/19 0326


   Last Action: Converted on 4/16/19 1044 by SINA AGUILAR


Fluticasone/Salmeterol (Advair 250-50 Diskus) 1 Each Disk.w.dev, 1 INH IH Q3DAYS

, (Reported)


   Entered as Reported by: SAMMI MADERA on 12/12/17 0700


   Last Action: Converted on 4/16/19 1044 by SINA AGUILAR


Hydralazine Hcl (Hydralazine Hcl) 10 Mg Tablet, 10 MG PO BID for high bp, (

Reported)


   Entered as Reported by: Cassandra Meyer on 4/15/19 2048


   Last Action: Converted on 4/16/19 1044 by SINA AGUILAR


Triamterene/Hydrochlorothiazid (Triamterene-Hctz 75-50 Mg Tab) 1 Each Tablet, 1 

TAB PO DAILY for htn, #30 Ref 5 (Reported)


   Entered as Reported by: Cassandra Meyer on 4/15/19 2048


   Last Action: Converted on 4/16/19 1044 by SINA AGUILAR


Ubidecarenone (Coq-10) 100 Mg Capsule, 100 MG PO DAILY for ORDERED, (Reported)


   Entered as Reported by: Cassandra Meyer on 4/16/19 0326


   Last Action: New Order on 4/16/19 0326 by Cassandra Meyer





Scheduled PRN


Acetaminophen (Acetaminophen) 500 Mg Tablet, 500 MG PO PRN TID PRN for PAIN, (

Reported)


   Entered as Reported by: Cassandra Meyer on 4/16/19 0322


   Last Action: Converted on 4/16/19 1044 by SINA AGUILAR


Albuterol Sulfate (Proair Hfa Inhaler) 8.5 Gm Hfa.aer.ad, 2 PUFF INH PRN Q6HRS 

PRN for SHORTNESS OF BREATH, Ref 0 (Reported)


   Entered as Reported by: Cassandra Myeer on 4/16/19 0322


   Last Action: New Order on 4/16/19 0322 by Cassandra Meyer


Loratadine (Loratadine) 10 Mg Tablet, 10 MG PO DAILY PRN for ALLERGIES, (

Reported)


   Entered as Reported by: Cassandra Meyer on 4/15/19 2048


   Last Action: Converted on 4/16/19 1044 by SINA AGUILAR


Nitroglycerin (NITROGLYCERIN SubLingual) 0.4 Mg Tab.subl, 0.4 MG SL PRN Q5MIN 

PRN for CHEST PAIN, (Reported)


   Entered as Reported by: Cassandra Meyer on 4/16/19 0326


   Last Action: New Order on 4/16/19 0326 by Cassandra Meyer





Discontinued Medications


Albuterol Sulfate (Proair Hfa Inhaler) 8.5 Gm Hfa.aer.ad, 1 PUFF INH PRN Q6HRS 

PRN for SHORTNESS OF BREATH, Ref 0 (Reported)


   Entered as Reported by: SAMMI MADERA on 12/12/17 0700


   Last Action: Discontinued on 4/15/19 2040 by MARIE Phipps MD Apr 16, 2019 17:25

## 2019-04-16 NOTE — PDOC2
CARDIAC CONSULT


DATE OF CONSULT


Date of Consult


DATE: 4/16/19 


TIME: 07:49





REASON FOR CONSULT


Reason for Consult:


Chest pain





REFERRING PHYSICIAN


Referring Physician:


Brionna





SOURCE


Source:  Chart review, Patient





HISTORY OF PRESENT ILLNESS


HISTORY OF PRESENT ILLNESS


This is a pleasant 86 yo female admitted for complains of cough.  Reports that 

this has been going on in the last 4 months.  It did get better with 

antibiotics but it is back again at least in the last 2 weeks.  Reports of 

sharp chest pain to left that is reproducible with palpation and hurts more 

with coughing and taking a deep breath.  Reports no nausea or palpitations.  

also noted discomfort her stomach which then goes down to her lower abdomen and 

around the subcostal region around and also "My abdomen hurts and turns white".

  No persistent diarrhea or vomiting.  Reports of nasal congestion and feeling 

of drain from his nose to throat blaming it on the concrete building creating 

cool environment. She does have CAD but no chest tightness and does have SOA 

but no changes from her baseline.  She is frustrated with her nasal congestion 

and coughing coming back. No jaw pain, falls or any recent injury.





PAST MEDICAL HISTORY


Cardiovascular:  CAD, HTN, Other (prior left renal artery stenosis, mild to mod 

right RYA)


Pulmonary:  Asthma, COPD, Pneumonia


CENTRAL NERVOUS SYSTEM:  Other (No pertinent history)


GI:  Diverticulosis, GERD, GI bleed, Other (hiatal hernia)


Heme/Onc:  No pertinent hx


Hepatobiliary:  No pertinent hx


Psych:  No pertinent hx


Musculoskeletal:  Osteoarthritis


Rheumatologic:  No pertinent hx


Infectious disease:  No pertinent hx


ENT:  No pertinent hx


Renal/:  UTI


Endocrine:  No pertinent hx


Dermatology:  No pertinent hx





PAST SURGICAL HISTORY


Past Surgical History:  Cholecystectomy, Tubal Ligation, Hysterectomy, Other (

left renal stenosis with stenting; LHC)





FAMILY HISTORY


Family History


noncontributory to CV





SOCIAL HISTORY


Smoke:  No


ALCOHOL:  none


Drugs:  None


Lives:  Alone (assisted living)





CURRENT MEDICATIONS


CURRENT MEDICATIONS





Current Medications








 Medications


  (Trade)  Dose


 Ordered  Sig/Josette


 Route


 PRN Reason  Start Time


 Stop Time Status Last Admin


Dose Admin


 


 Albuterol/


 Ipratropium


  (Duoneb)  3 ml  1X  ONCE


 NEB


   4/15/19 15:45


 4/15/19 15:46 DC 4/15/19 15:55





 


 Methylprednisolone


 Sodium Succinate


  (SOLU-Medrol


 125MG VIAL)  125 mg  1X  ONCE


 IV


   4/15/19 15:45


 4/15/19 15:48 DC 4/15/19 16:07





 


 Doxycycline


 Hyclate


  (Vibra-Tab)  100 mg  1X  ONCE


 PO


   4/15/19 17:30


 4/15/19 17:31 DC 4/15/19 19:11





 


 Acetaminophen


  (Tylenol)  650 mg  PRN Q4HRS  PRN


 PO


 FEVER  4/15/19 17:45


 4/16/19 17:44  4/15/19 21:41





 


 Albuterol/


 Ipratropium


  (Duoneb)  3 ml  RTQID


 NEB


   4/15/19 20:00


 4/16/19 19:59  4/16/19 07:43














ALLERGIES


ALLERGIES:  


Coded Allergies:  


     amlodipine (Verified  Allergy, Intermediate, 12/11/17)


     dexlansoprazole (Verified  Allergy, Intermediate, 12/11/17)


     fluticasone (Verified  Allergy, Intermediate, Uses ADVAIR at home, 1/30/15)


     levofloxacin (Verified  Allergy, Intermediate, 12/11/17)


     rabeprazole (Verified  Allergy, Intermediate, 12/11/17)


     ranitidine (Verified  Allergy, Intermediate, 12/11/17)





ROS


Review of System


14 point ROS evaluated with pertinent positives noted per HPI





PHYSICAL EXAM


General:  Alert, Oriented X3, Cooperative, No acute distress


HEENT:  Atraumatic, Mucous membr. moist/pink


Lungs:  Clear to auscultation, Normal air movement


Heart:  Regular rate (SR), Normal S1, Normal S2, Other (3/6 systolic murmur to 

LLS border)


Extremities:  No cyanosis, No edema


Skin:  No breakdown, No significant lesion


Neuro:  Normal speech, Sensation intact


Psych/Mental Status:  Mental status NL, Mood NL


MUSCULOSKELETAL:  Osteoarthritic changes both hands





VITALS


VITALS





Vital Signs








  Date Time  Temp Pulse Resp B/P (MAP) Pulse Ox O2 Delivery O2 Flow Rate FiO2


 


4/16/19 07:34 97.7 78 20 146/90 (108) 98 Room Air  





 97.7       











LABS


Lab:





Laboratory Tests








Test


 4/15/19


14:28 4/15/19


15:10 4/15/19


15:15 4/15/19


15:39


 


White Blood Count


 6.4 x10^3/uL


(4.0-11.0) 


 


 





 


Red Blood Count


 4.56 x10^6/uL


(3.50-5.40) 


 


 





 


Hemoglobin


 12.0 g/dL


(12.0-15.5) 


 


 





 


Hematocrit


 37.9 %


(36.0-47.0) 


 


 





 


Mean Corpuscular Volume 83 fL ()    


 


Mean Corpuscular Hemoglobin 26 pg (25-35)    


 


Mean Corpuscular Hemoglobin


Concent 32 g/dL


(31-37) 


 


 





 


Red Cell Distribution Width


 14.4 %


(11.5-14.5) 


 


 





 


Platelet Count


 208 x10^3/uL


(140-400) 


 


 





 


Neutrophils (%) (Auto) 65 % (31-73)    


 


Lymphocytes (%) (Auto) 21 % (24-48)    


 


Monocytes (%) (Auto) 8 % (0-9)    


 


Eosinophils (%) (Auto) 5 % (0-3)    


 


Basophils (%) (Auto) 1 % (0-3)    


 


Neutrophils # (Auto)


 4.1 x10^3uL


(1.8-7.7) 


 


 





 


Lymphocytes # (Auto)


 1.3 x10^3/uL


(1.0-4.8) 


 


 





 


Monocytes # (Auto)


 0.5 x10^3/uL


(0.0-1.1) 


 


 





 


Eosinophils # (Auto)


 0.3 x10^3/uL


(0.0-0.7) 


 


 





 


Basophils # (Auto)


 0.1 x10^3/uL


(0.0-0.2) 


 


 





 


Sodium Level


 140 mmol/L


(136-145) 


 


 





 


Potassium Level


 3.6 mmol/L


(3.5-5.1) 


 


 





 


Chloride Level


 103 mmol/L


() 


 


 





 


Carbon Dioxide Level


 29 mmol/L


(21-32) 


 


 





 


Anion Gap 8 (6-14)    


 


Blood Urea Nitrogen


 19 mg/dL


(7-20) 


 


 





 


Creatinine


 1.0 mg/dL


(0.6-1.0) 


 


 





 


Estimated GFR


(Cockcroft-Gault) 63.5 


 


 


 





 


BUN/Creatinine Ratio 19 (6-20)    


 


Glucose Level


 75 mg/dL


(70-99) 


 


 





 


Calcium Level


 9.2 mg/dL


(8.5-10.1) 


 


 





 


Total Bilirubin


 0.3 mg/dL


(0.2-1.0) 


 


 





 


Aspartate Amino Transf


(AST/SGOT) 22 U/L (15-37) 


 


 


 





 


Alanine Aminotransferase


(ALT/SGPT) 11 U/L (14-59) 


 


 


 





 


Alkaline Phosphatase


 60 U/L


() 


 


 





 


Troponin I Quantitative


 < 0.017 ng/mL


(0.000-0.055) 


 


 





 


NT-Pro-B-Type Natriuretic


Peptide 429 pg/mL


(0-449) 


 


 





 


Total Protein


 7.4 g/dL


(6.4-8.2) 


 


 





 


Albumin


 3.3 g/dL


(3.4-5.0) 


 


 





 


Albumin/Globulin Ratio 0.8 (1.0-1.7)    


 


Lipase


 188 U/L


() 


 


 





 


D-Dimer (Myra)


 


 1.85 ug/mlFEU


(0.00-0.50) 


 





 


Urine Collection Type   Unknown  


 


Urine Color   Yellow  


 


Urine Clarity   Clear  


 


Urine pH   6.5  


 


Urine Specific Gravity   <=1.005  


 


Urine Protein


 


 


 Negative mg/dL


(NEG-TRACE) 





 


Urine Glucose (UA)


 


 


 Negative mg/dL


(NEG) 





 


Urine Ketones (Stick)


 


 


 Negative mg/dL


(NEG) 





 


Urine Blood   Negative (NEG)  


 


Urine Nitrite   Negative (NEG)  


 


Urine Bilirubin   Negative (NEG)  


 


Urine Urobilinogen Dipstick


 


 


 0.2 mg/dL (0.2


mg/dL) 





 


Urine Leukocyte Esterase   Small (NEG)  


 


Urine RBC   0 /HPF (0-2)  


 


Urine WBC   0 /HPF (0-4)  


 


Urine Squamous Epithelial


Cells 


 


 Few /LPF 


 





 


Urine Bacteria   0 /HPF (0-FEW)  


 


Influenza Type A Antigen


 


 


 


 Negative


(NEGATIVE)


 


Influenza Type B Antigen


 


 


 


 Negative


(NEGATIVE)


 


Test


 4/15/19


23:50 4/16/19


04:00 


 





 


Troponin I Quantitative


 < 0.017 ng/mL


(0.000-0.055) 


 


 





 


White Blood Count


 


 4.7 x10^3/uL


(4.0-11.0) 


 





 


Red Blood Count


 


 4.59 x10^6/uL


(3.50-5.40) 


 





 


Hemoglobin


 


 12.1 g/dL


(12.0-15.5) 


 





 


Hematocrit


 


 38.1 %


(36.0-47.0) 


 





 


Mean Corpuscular Volume  83 fL ()   


 


Mean Corpuscular Hemoglobin  26 pg (25-35)   


 


Mean Corpuscular Hemoglobin


Concent 


 32 g/dL


(31-37) 


 





 


Red Cell Distribution Width


 


 14.5 %


(11.5-14.5) 


 





 


Platelet Count


 


 201 x10^3/uL


(140-400) 


 





 


Neutrophils (%) (Auto)  84 % (31-73)   


 


Lymphocytes (%) (Auto)  13 % (24-48)   


 


Monocytes (%) (Auto)  2 % (0-9)   


 


Eosinophils (%) (Auto)  0 % (0-3)   


 


Basophils (%) (Auto)  1 % (0-3)   


 


Neutrophils # (Auto)


 


 4.0 x10^3uL


(1.8-7.7) 


 





 


Lymphocytes # (Auto)


 


 0.6 x10^3/uL


(1.0-4.8) 


 





 


Monocytes # (Auto)


 


 0.1 x10^3/uL


(0.0-1.1) 


 





 


Eosinophils # (Auto)


 


 0.0 x10^3/uL


(0.0-0.7) 


 





 


Basophils # (Auto)


 


 0.0 x10^3/uL


(0.0-0.2) 


 





 


Sodium Level


 


 140 mmol/L


(136-145) 


 





 


Potassium Level


 


 4.0 mmol/L


(3.5-5.1) 


 





 


Chloride Level


 


 104 mmol/L


() 


 





 


Carbon Dioxide Level


 


 26 mmol/L


(21-32) 


 





 


Anion Gap  10 (6-14)   


 


Blood Urea Nitrogen


 


 18 mg/dL


(7-20) 


 





 


Creatinine


 


 1.0 mg/dL


(0.6-1.0) 


 





 


Estimated GFR


(Cockcroft-Gault) 


 63.5 


 


 





 


BUN/Creatinine Ratio  18 (6-20)   


 


Glucose Level


 


 134 mg/dL


(70-99) 


 





 


Calcium Level


 


 9.1 mg/dL


(8.5-10.1) 


 





 


Total Bilirubin


 


 0.2 mg/dL


(0.2-1.0) 


 





 


Aspartate Amino Transf


(AST/SGOT) 


 21 U/L (15-37) 


 


 





 


Alanine Aminotransferase


(ALT/SGPT) 


 15 U/L (14-59) 


 


 





 


Alkaline Phosphatase


 


 64 U/L


() 


 





 


Total Protein


 


 7.4 g/dL


(6.4-8.2) 


 





 


Albumin


 


 3.1 g/dL


(3.4-5.0) 


 





 


Albumin/Globulin Ratio  0.7 (1.0-1.7)   











ECHOCARDIOGRAM


ECHOCARDIOGRAM





<Conclusion>


The left ventricle is normal size.


The left ventricular systolic function is normal and the ejection fraction is 

within normal range.


The Ejection Fraction is 55-60%.


There is mild concentric left ventricular hypertrophy.


There is no significant aortic valvular stenosis.


Doppler and Color Flow revealed no significant aortic regurgitation.


Doppler and Color-flow revealed trace mitral regurgitation.


Doppler and Color Flow revealed trace tricuspid regurgitation.


The PA pressure was estimated at 24 mmHg.





DATE:     12/12/17 1607





ASSESSMENT/PLAN


ASSESSMENT/PLAN


1. Atypical chest pain: trops nml chronic RBBB no acute changes. Likely MSK/

pleuritic


2. COPD with chronic cough with likely postnasal drip syndrome 


3. GERD exacerbation? r/t to above with associated hiatal hernia 


4. CAD: no past stents, moderate disease per pt. 


5. RAY: stent to left, mild to mod per right 2017 duplex


6. HTN: labile episodes but currently controlled


7. GERD/hiatal hernia


8. HLP





Recommendations


TTE, lipids, check Mg replace if low. 


May need to transition to PPI from H2 blocker with allergy meds, defer to PCP


Consider outpt stress test.  May follow up with Dr. Hager at  or transfer to 

us, pt will decide, card given. 


Resume home BP meds. ASA.  Start on statin











KORINA LARA APRN Apr 16, 2019 07:57

## 2019-04-16 NOTE — PDOC1
History and Physical


Date of Admission


Date of Admission


DATE: 4/16/19 


TIME: 11:00





Identification/Chief Complaint


Chief Complaint


sob, abdominal pain





Source


Source:  Chart review, Patient





History of Present Illness


History of Present Illness


88 y/o female with hx of COPD, HTN with multitude of complaints: sob, cough-non-

productive, and diffuse abdominal pain. states sob and cough have been present 

for many months. Also Reports of sharp chest pain to left that is reproducible 

with palpation and exacerbated by coughing and on deep inspiration.  History of 

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 patient's 

main concern is abdominal pain and frustrated that people keep asking her about 

her chest pain. No diarrhea or vomiting. no  fever. Denies lower leg swelling, 

abdominal pain, vomiting, dizziness, weakness, headache, neck pain.





In the ER patient found to be wheezing and given solumedrol, breathing 

treatments and admitted to hospitalist service .





Past Medical History


Cardiovascular:  CAD, HTN, Other (prior left renal artery stenosis, mild to mod 

right RAY)


Pulmonary:  Asthma, COPD, Pneumonia


CENTRAL NERVOUS SYSTEM:  Other (No pertinent history)


GI:  Diverticulosis, GERD, GI bleed, Other (hiatal hernia)


Heme/Onc:  No pertinent hx


Hepatobiliary:  No pertinent hx


Psych:  No pertinent hx


Musculoskeletal:  Osteoarthritis


Rheumatologic:  No pertinent hx


Infectious disease:  No pertinent hx


ENT:  No pertinent hx


Renal/:  UTI


Endocrine:  No pertinent hx


Dermatology:  No pertinent hx





Past Surgical History


Past Surgical History:  Cholecystectomy, Tubal Ligation, Hysterectomy, Other (

left renal stenosis with stenting; LHC)





Social History


Smoke:  No


ALCOHOL:  none


Drugs:  None





Current Problem List


Problem List


Problems


Medical Problems:


(1) Chest pain


Status: Acute  





(2) COPD with exacerbation


Status: Acute  











Current Medications


Current Medications





Current Medications


Albuterol/ Ipratropium (Duoneb) 3 ml 1X  ONCE NEB  Last administered on 4/15/

19at 15:55;  Start 4/15/19 at 15:45;  Stop 4/15/19 at 15:46;  Status DC


Methylprednisolone Sodium Succinate (SOLU-Medrol 125MG VIAL) 125 mg 1X  ONCE IV

  Last administered on 4/15/19at 16:07;  Start 4/15/19 at 15:45;  Stop 4/15/19 

at 15:48;  Status DC


Iohexol (Omnipaque 300 Mg/ml) 100 ml 1X  ONCE IV ;  Start 4/15/19 at 16:00;  

Stop 4/15/19 at 16:02;  Status DC


Info (CONTRAST GIVEN -- Rx MONITORING) 1 each PRN DAILY  PRN MC SEE COMMENTS;  

Start 4/15/19 at 16:15;  Stop 4/15/19 at 16:35;  Status DC


Iohexol (Omnipaque 350 Mg/ml) 100 ml 1X  ONCE IV ;  Start 4/15/19 at 16:45;  

Stop 4/15/19 at 16:46;  Status DC


Info (CONTRAST GIVEN -- Rx MONITORING) 1 each PRN DAILY  PRN MC SEE COMMENTS;  

Start 4/15/19 at 16:45;  Stop 4/17/19 at 16:44


Doxycycline Hyclate (Vibra-Tab) 100 mg 1X  ONCE PO  Last administered on 4/15/

19at 19:11;  Start 4/15/19 at 17:30;  Stop 4/15/19 at 17:31;  Status DC


Ondansetron HCl (Zofran) 4 mg PRN Q8HRS  PRN IV NAUSEA/VOMITING;  Start 4/15/19 

at 17:45;  Stop 4/16/19 at 17:44


Acetaminophen (Tylenol) 650 mg PRN Q4HRS  PRN PO FEVER Last administered on 4/15

/19at 21:41;  Start 4/15/19 at 17:45;  Stop 4/16/19 at 17:44


Albuterol/ Ipratropium (Duoneb) 3 ml RTQID NEB  Last administered on 4/16/19at 

07:43;  Start 4/15/19 at 20:00;  Stop 4/16/19 at 19:59


Albuterol Sulfate (Ventolin Neb Soln) 2.5 mg PRN Q2HRS  PRN NEB SHORTNESS OF 

BREATH;  Start 4/15/19 at 21:15


Prednisone (Prednisone) 60 mg DAILY PO ;  Start 4/16/19 at 09:00


Hydralazine HCl (Apresoline Inj) 10 mg PRN Q6HRS  PRN IVP ELEVATED BP, SEE 

COMMENTS;  Start 4/15/19 at 21:00


Atorvastatin Calcium (Lipitor) 40 mg QHS PO ;  Start 4/16/19 at 21:00


Aspirin (Ecotrin) 81 mg HS PO ;  Start 4/16/19 at 21:00


Famotidine (Pepcid) 20 mg HS PO ;  Start 4/16/19 at 21:00


Acetaminophen (Tylenol) 500 mg PRN TID  PRN PO PAIN;  Start 4/16/19 at 10:45


Non-Formulary Medication (Flaxseed/ Omega3,6,9/Fatty Acid (Flax Seed Oil 1,300 

Mg Softgel)) 1 each DAILY PO ;  Start 4/17/19 at 09:00;  Status UNV


Non-Formulary Medication (Fluticasone/ Salmeterol (Advair 250-50 Diskus)) 1 inh 

Q3DAYS IH ;  Start 4/19/19 at 09:00;  Status UNV


Hydralazine HCl (Apresoline) 10 mg BID PO ;  Start 4/16/19 at 11:00


Cetirizine HCl (ZyrTEC) 10 mg PRN DAILY  PRN PO ALLERGIES;  Start 4/17/19 at 09:

00


Triamterene/HCTZ (Maxzide 37.5/ 25mg) 2 tab DAILY PO ;  Start 4/16/19 at 11:00





Active Scripts


Active


Reported


NITROGLYCERIN SubLingual (Nitroglycerin) 0.4 Mg Tab.subl 0.4 Mg SL PRN Q5MIN PRN


Flax Seed Oil 1,300 Mg Softgel (Flaxseed/Omega3,6,9/Fatty Acid) 1 Each Capsule 

1 Each PO DAILY


Coq-10 (Ubidecarenone) 100 Mg Capsule 100 Mg PO DAILY


Aspirin Ec (Aspirin) 81 Mg Tablet.dr 1 Tab PO HS


Proair Hfa Inhaler (Albuterol Sulfate) 8.5 Gm Hfa.aer.ad 2 Puff INH PRN Q6HRS 

PRN


Acetaminophen 500 Mg Tablet 500 Mg PO PRN TID PRN


Triamterene-Hctz 75-50 Mg Tab (Triamterene/Hydrochlorothiazid) 1 Each Tablet 1 

Tab PO DAILY


Loratadine 10 Mg Tablet 10 Mg PO DAILY PRN


Hydralazine Hcl 10 Mg Tablet 10 Mg PO BID


Pepcid (Famotidine) 20 Mg Tablet 20 Mg PO HS


Advair 250-50 Diskus (Fluticasone/Salmeterol) 1 Each Disk.w.dev 1 Inh IH Q3DAYS





Allergies


Allergies:  


Coded Allergies:  


     amlodipine (Verified  Allergy, Intermediate, 12/11/17)


     dexlansoprazole (Verified  Allergy, Intermediate, 12/11/17)


     fluticasone (Verified  Allergy, Intermediate, Uses ADVAIR at home, 1/30/15)


     levofloxacin (Verified  Allergy, Intermediate, 12/11/17)


     rabeprazole (Verified  Allergy, Intermediate, 12/11/17)


     ranitidine (Verified  Allergy, Intermediate, 12/11/17)





ROS


Review of System


CONSTITUTIONAL:        No fever or chills


EYES:                          No recent changes


SKIN:               No rash or itching


CARDIOVASCULAR:     No chest pain, syncope, palpitations, or edema


RESPIRATORY:            No SOB or cough


GASTROINTESTINAL:    No nausea, vomiting or abdominal pain


NEUROLOGICAL:          No headaches or weakness


ENDOCRINE:               No cold or heat intolerance


GENITOURINARY:         No urgency or frequency of urination


MUSCULOSKELETAL:   No back pain or joint pain


LYMPHATICS:               No enlarged lymph nodes


PSYCHIATRIC:              No anxiety or depression





Physical Exam


Physical Exam


GENERAL:       No apparent distress. Alert and oriented.


HEENT:            Head normocephalic, atraumatic.


NECK:              Supple


LUNGS:            Clear to auscultation.


HEART:            RRR, S1, S2 present, pulses intact


ABDOMEN:       Soft, positive bowel sounds.


EXTREMITIES:  No cyanosis or edema.


NEUROLOGIC:  Normal speech, normal tone


PSYCHIATRIC:  Normal affect, normal mood.


SKIN:                No ulceration.





Vitals


Vitals





Vital Signs








  Date Time  Temp Pulse Resp B/P (MAP) Pulse Ox O2 Delivery O2 Flow Rate FiO2


 


4/16/19 07:46      Room Air  


 


4/16/19 07:34 97.7 78 20 146/90 (108) 98   





 97.7       











Labs


Labs





Laboratory Tests








Test


 4/15/19


14:28 4/15/19


15:10 4/15/19


15:15 4/15/19


15:39


 


White Blood Count


 6.4 x10^3/uL


(4.0-11.0) 


 


 





 


Red Blood Count


 4.56 x10^6/uL


(3.50-5.40) 


 


 





 


Hemoglobin


 12.0 g/dL


(12.0-15.5) 


 


 





 


Hematocrit


 37.9 %


(36.0-47.0) 


 


 





 


Mean Corpuscular Volume 83 fL ()    


 


Mean Corpuscular Hemoglobin 26 pg (25-35)    


 


Mean Corpuscular Hemoglobin


Concent 32 g/dL


(31-37) 


 


 





 


Red Cell Distribution Width


 14.4 %


(11.5-14.5) 


 


 





 


Platelet Count


 208 x10^3/uL


(140-400) 


 


 





 


Neutrophils (%) (Auto) 65 % (31-73)    


 


Lymphocytes (%) (Auto) 21 % (24-48)    


 


Monocytes (%) (Auto) 8 % (0-9)    


 


Eosinophils (%) (Auto) 5 % (0-3)    


 


Basophils (%) (Auto) 1 % (0-3)    


 


Neutrophils # (Auto)


 4.1 x10^3uL


(1.8-7.7) 


 


 





 


Lymphocytes # (Auto)


 1.3 x10^3/uL


(1.0-4.8) 


 


 





 


Monocytes # (Auto)


 0.5 x10^3/uL


(0.0-1.1) 


 


 





 


Eosinophils # (Auto)


 0.3 x10^3/uL


(0.0-0.7) 


 


 





 


Basophils # (Auto)


 0.1 x10^3/uL


(0.0-0.2) 


 


 





 


Sodium Level


 140 mmol/L


(136-145) 


 


 





 


Potassium Level


 3.6 mmol/L


(3.5-5.1) 


 


 





 


Chloride Level


 103 mmol/L


() 


 


 





 


Carbon Dioxide Level


 29 mmol/L


(21-32) 


 


 





 


Anion Gap 8 (6-14)    


 


Blood Urea Nitrogen


 19 mg/dL


(7-20) 


 


 





 


Creatinine


 1.0 mg/dL


(0.6-1.0) 


 


 





 


Estimated GFR


(Cockcroft-Gault) 63.5 


 


 


 





 


BUN/Creatinine Ratio 19 (6-20)    


 


Glucose Level


 75 mg/dL


(70-99) 


 


 





 


Calcium Level


 9.2 mg/dL


(8.5-10.1) 


 


 





 


Total Bilirubin


 0.3 mg/dL


(0.2-1.0) 


 


 





 


Aspartate Amino Transf


(AST/SGOT) 22 U/L (15-37) 


 


 


 





 


Alanine Aminotransferase


(ALT/SGPT) 11 U/L (14-59) 


 


 


 





 


Alkaline Phosphatase


 60 U/L


() 


 


 





 


Troponin I Quantitative


 < 0.017 ng/mL


(0.000-0.055) 


 


 





 


NT-Pro-B-Type Natriuretic


Peptide 429 pg/mL


(0-449) 


 


 





 


Total Protein


 7.4 g/dL


(6.4-8.2) 


 


 





 


Albumin


 3.3 g/dL


(3.4-5.0) 


 


 





 


Albumin/Globulin Ratio 0.8 (1.0-1.7)    


 


Lipase


 188 U/L


() 


 


 





 


D-Dimer (Myra)


 


 1.85 ug/mlFEU


(0.00-0.50) 


 





 


Urine Collection Type   Unknown  


 


Urine Color   Yellow  


 


Urine Clarity   Clear  


 


Urine pH   6.5  


 


Urine Specific Gravity   <=1.005  


 


Urine Protein


 


 


 Negative mg/dL


(NEG-TRACE) 





 


Urine Glucose (UA)


 


 


 Negative mg/dL


(NEG) 





 


Urine Ketones (Stick)


 


 


 Negative mg/dL


(NEG) 





 


Urine Blood   Negative (NEG)  


 


Urine Nitrite   Negative (NEG)  


 


Urine Bilirubin   Negative (NEG)  


 


Urine Urobilinogen Dipstick


 


 


 0.2 mg/dL (0.2


mg/dL) 





 


Urine Leukocyte Esterase   Small (NEG)  


 


Urine RBC   0 /HPF (0-2)  


 


Urine WBC   0 /HPF (0-4)  


 


Urine Squamous Epithelial


Cells 


 


 Few /LPF 


 





 


Urine Bacteria   0 /HPF (0-FEW)  


 


Influenza Type A Antigen


 


 


 


 Negative


(NEGATIVE)


 


Influenza Type B Antigen


 


 


 


 Negative


(NEGATIVE)


 


Test


 4/15/19


23:50 4/16/19


04:00 


 





 


Troponin I Quantitative


 < 0.017 ng/mL


(0.000-0.055) 


 


 





 


White Blood Count


 


 4.7 x10^3/uL


(4.0-11.0) 


 





 


Red Blood Count


 


 4.59 x10^6/uL


(3.50-5.40) 


 





 


Hemoglobin


 


 12.1 g/dL


(12.0-15.5) 


 





 


Hematocrit


 


 38.1 %


(36.0-47.0) 


 





 


Mean Corpuscular Volume  83 fL ()   


 


Mean Corpuscular Hemoglobin  26 pg (25-35)   


 


Mean Corpuscular Hemoglobin


Concent 


 32 g/dL


(31-37) 


 





 


Red Cell Distribution Width


 


 14.5 %


(11.5-14.5) 


 





 


Platelet Count


 


 201 x10^3/uL


(140-400) 


 





 


Neutrophils (%) (Auto)  84 % (31-73)   


 


Lymphocytes (%) (Auto)  13 % (24-48)   


 


Monocytes (%) (Auto)  2 % (0-9)   


 


Eosinophils (%) (Auto)  0 % (0-3)   


 


Basophils (%) (Auto)  1 % (0-3)   


 


Neutrophils # (Auto)


 


 4.0 x10^3uL


(1.8-7.7) 


 





 


Lymphocytes # (Auto)


 


 0.6 x10^3/uL


(1.0-4.8) 


 





 


Monocytes # (Auto)


 


 0.1 x10^3/uL


(0.0-1.1) 


 





 


Eosinophils # (Auto)


 


 0.0 x10^3/uL


(0.0-0.7) 


 





 


Basophils # (Auto)


 


 0.0 x10^3/uL


(0.0-0.2) 


 





 


Sodium Level


 


 140 mmol/L


(136-145) 


 





 


Potassium Level


 


 4.0 mmol/L


(3.5-5.1) 


 





 


Chloride Level


 


 104 mmol/L


() 


 





 


Carbon Dioxide Level


 


 26 mmol/L


(21-32) 


 





 


Anion Gap  10 (6-14)   


 


Blood Urea Nitrogen


 


 18 mg/dL


(7-20) 


 





 


Creatinine


 


 1.0 mg/dL


(0.6-1.0) 


 





 


Estimated GFR


(Cockcroft-Gault) 


 63.5 


 


 





 


BUN/Creatinine Ratio  18 (6-20)   


 


Glucose Level


 


 134 mg/dL


(70-99) 


 





 


Calcium Level


 


 9.1 mg/dL


(8.5-10.1) 


 





 


Magnesium Level


 


 2.2 mg/dL


(1.8-2.4) 


 





 


Total Bilirubin


 


 0.2 mg/dL


(0.2-1.0) 


 





 


Aspartate Amino Transf


(AST/SGOT) 


 21 U/L (15-37) 


 


 





 


Alanine Aminotransferase


(ALT/SGPT) 


 15 U/L (14-59) 


 


 





 


Alkaline Phosphatase


 


 64 U/L


() 


 





 


Total Protein


 


 7.4 g/dL


(6.4-8.2) 


 





 


Albumin


 


 3.1 g/dL


(3.4-5.0) 


 





 


Albumin/Globulin Ratio  0.7 (1.0-1.7)   


 


Triglycerides Level


 


 34 mg/dL


(0-150) 


 





 


Cholesterol Level


 


 254 mg/dL


(0-200) 


 





 


LDL Cholesterol, Calculated


 


 176 mg/dL


(0-100) 


 





 


VLDL Cholesterol, Calculated  7 mg/dL (0-40)   


 


Non-HDL Cholesterol Calculated


 


 183 mg/dL


(0-129) 


 





 


HDL Cholesterol


 


 71 mg/dL


(40-60) 


 





 


Cholesterol/HDL Ratio  3.6   


 


Thyroid Stimulating Hormone


(TSH) 


 0.584 uIU/mL


(0.358-3.74) 


 











Laboratory Tests








Test


 4/15/19


14:28 4/15/19


15:10 4/15/19


15:15 4/15/19


15:39


 


White Blood Count


 6.4 x10^3/uL


(4.0-11.0) 


 


 





 


Red Blood Count


 4.56 x10^6/uL


(3.50-5.40) 


 


 





 


Hemoglobin


 12.0 g/dL


(12.0-15.5) 


 


 





 


Hematocrit


 37.9 %


(36.0-47.0) 


 


 





 


Mean Corpuscular Volume 83 fL ()    


 


Mean Corpuscular Hemoglobin 26 pg (25-35)    


 


Mean Corpuscular Hemoglobin


Concent 32 g/dL


(31-37) 


 


 





 


Red Cell Distribution Width


 14.4 %


(11.5-14.5) 


 


 





 


Platelet Count


 208 x10^3/uL


(140-400) 


 


 





 


Neutrophils (%) (Auto) 65 % (31-73)    


 


Lymphocytes (%) (Auto) 21 % (24-48)    


 


Monocytes (%) (Auto) 8 % (0-9)    


 


Eosinophils (%) (Auto) 5 % (0-3)    


 


Basophils (%) (Auto) 1 % (0-3)    


 


Neutrophils # (Auto)


 4.1 x10^3uL


(1.8-7.7) 


 


 





 


Lymphocytes # (Auto)


 1.3 x10^3/uL


(1.0-4.8) 


 


 





 


Monocytes # (Auto)


 0.5 x10^3/uL


(0.0-1.1) 


 


 





 


Eosinophils # (Auto)


 0.3 x10^3/uL


(0.0-0.7) 


 


 





 


Basophils # (Auto)


 0.1 x10^3/uL


(0.0-0.2) 


 


 





 


Sodium Level


 140 mmol/L


(136-145) 


 


 





 


Potassium Level


 3.6 mmol/L


(3.5-5.1) 


 


 





 


Chloride Level


 103 mmol/L


() 


 


 





 


Carbon Dioxide Level


 29 mmol/L


(21-32) 


 


 





 


Anion Gap 8 (6-14)    


 


Blood Urea Nitrogen


 19 mg/dL


(7-20) 


 


 





 


Creatinine


 1.0 mg/dL


(0.6-1.0) 


 


 





 


Estimated GFR


(Cockcroft-Gault) 63.5 


 


 


 





 


BUN/Creatinine Ratio 19 (6-20)    


 


Glucose Level


 75 mg/dL


(70-99) 


 


 





 


Calcium Level


 9.2 mg/dL


(8.5-10.1) 


 


 





 


Total Bilirubin


 0.3 mg/dL


(0.2-1.0) 


 


 





 


Aspartate Amino Transf


(AST/SGOT) 22 U/L (15-37) 


 


 


 





 


Alanine Aminotransferase


(ALT/SGPT) 11 U/L (14-59) 


 


 


 





 


Alkaline Phosphatase


 60 U/L


() 


 


 





 


Troponin I Quantitative


 < 0.017 ng/mL


(0.000-0.055) 


 


 





 


NT-Pro-B-Type Natriuretic


Peptide 429 pg/mL


(0-449) 


 


 





 


Total Protein


 7.4 g/dL


(6.4-8.2) 


 


 





 


Albumin


 3.3 g/dL


(3.4-5.0) 


 


 





 


Albumin/Globulin Ratio 0.8 (1.0-1.7)    


 


Lipase


 188 U/L


() 


 


 





 


D-Dimer (Myra)


 


 1.85 ug/mlFEU


(0.00-0.50) 


 





 


Urine Collection Type   Unknown  


 


Urine Color   Yellow  


 


Urine Clarity   Clear  


 


Urine pH   6.5  


 


Urine Specific Gravity   <=1.005  


 


Urine Protein


 


 


 Negative mg/dL


(NEG-TRACE) 





 


Urine Glucose (UA)


 


 


 Negative mg/dL


(NEG) 





 


Urine Ketones (Stick)


 


 


 Negative mg/dL


(NEG) 





 


Urine Blood   Negative (NEG)  


 


Urine Nitrite   Negative (NEG)  


 


Urine Bilirubin   Negative (NEG)  


 


Urine Urobilinogen Dipstick


 


 


 0.2 mg/dL (0.2


mg/dL) 





 


Urine Leukocyte Esterase   Small (NEG)  


 


Urine RBC   0 /HPF (0-2)  


 


Urine WBC   0 /HPF (0-4)  


 


Urine Squamous Epithelial


Cells 


 


 Few /LPF 


 





 


Urine Bacteria   0 /HPF (0-FEW)  


 


Influenza Type A Antigen


 


 


 


 Negative


(NEGATIVE)


 


Influenza Type B Antigen


 


 


 


 Negative


(NEGATIVE)


 


Test


 4/15/19


23:50 4/16/19


04:00 


 





 


Troponin I Quantitative


 < 0.017 ng/mL


(0.000-0.055) 


 


 





 


White Blood Count


 


 4.7 x10^3/uL


(4.0-11.0) 


 





 


Red Blood Count


 


 4.59 x10^6/uL


(3.50-5.40) 


 





 


Hemoglobin


 


 12.1 g/dL


(12.0-15.5) 


 





 


Hematocrit


 


 38.1 %


(36.0-47.0) 


 





 


Mean Corpuscular Volume  83 fL ()   


 


Mean Corpuscular Hemoglobin  26 pg (25-35)   


 


Mean Corpuscular Hemoglobin


Concent 


 32 g/dL


(31-37) 


 





 


Red Cell Distribution Width


 


 14.5 %


(11.5-14.5) 


 





 


Platelet Count


 


 201 x10^3/uL


(140-400) 


 





 


Neutrophils (%) (Auto)  84 % (31-73)   


 


Lymphocytes (%) (Auto)  13 % (24-48)   


 


Monocytes (%) (Auto)  2 % (0-9)   


 


Eosinophils (%) (Auto)  0 % (0-3)   


 


Basophils (%) (Auto)  1 % (0-3)   


 


Neutrophils # (Auto)


 


 4.0 x10^3uL


(1.8-7.7) 


 





 


Lymphocytes # (Auto)


 


 0.6 x10^3/uL


(1.0-4.8) 


 





 


Monocytes # (Auto)


 


 0.1 x10^3/uL


(0.0-1.1) 


 





 


Eosinophils # (Auto)


 


 0.0 x10^3/uL


(0.0-0.7) 


 





 


Basophils # (Auto)


 


 0.0 x10^3/uL


(0.0-0.2) 


 





 


Sodium Level


 


 140 mmol/L


(136-145) 


 





 


Potassium Level


 


 4.0 mmol/L


(3.5-5.1) 


 





 


Chloride Level


 


 104 mmol/L


() 


 





 


Carbon Dioxide Level


 


 26 mmol/L


(21-32) 


 





 


Anion Gap  10 (6-14)   


 


Blood Urea Nitrogen


 


 18 mg/dL


(7-20) 


 





 


Creatinine


 


 1.0 mg/dL


(0.6-1.0) 


 





 


Estimated GFR


(Cockcroft-Gault) 


 63.5 


 


 





 


BUN/Creatinine Ratio  18 (6-20)   


 


Glucose Level


 


 134 mg/dL


(70-99) 


 





 


Calcium Level


 


 9.1 mg/dL


(8.5-10.1) 


 





 


Magnesium Level


 


 2.2 mg/dL


(1.8-2.4) 


 





 


Total Bilirubin


 


 0.2 mg/dL


(0.2-1.0) 


 





 


Aspartate Amino Transf


(AST/SGOT) 


 21 U/L (15-37) 


 


 





 


Alanine Aminotransferase


(ALT/SGPT) 


 15 U/L (14-59) 


 


 





 


Alkaline Phosphatase


 


 64 U/L


() 


 





 


Total Protein


 


 7.4 g/dL


(6.4-8.2) 


 





 


Albumin


 


 3.1 g/dL


(3.4-5.0) 


 





 


Albumin/Globulin Ratio  0.7 (1.0-1.7)   


 


Triglycerides Level


 


 34 mg/dL


(0-150) 


 





 


Cholesterol Level


 


 254 mg/dL


(0-200) 


 





 


LDL Cholesterol, Calculated


 


 176 mg/dL


(0-100) 


 





 


VLDL Cholesterol, Calculated  7 mg/dL (0-40)   


 


Non-HDL Cholesterol Calculated


 


 183 mg/dL


(0-129) 


 





 


HDL Cholesterol


 


 71 mg/dL


(40-60) 


 





 


Cholesterol/HDL Ratio  3.6   


 


Thyroid Stimulating Hormone


(TSH) 


 0.584 uIU/mL


(0.358-3.74) 


 














VTE Prophylaxis Ordered


VTE Prophylaxis Devices:  Yes


VTE Pharmacological Prophylaxi:  No





Assessment/Plan


Assessment/Plan


ASSESSMENT 


Acute Exacerbation of COPD


Non-cardiac CP


Abdominal Pain 


GERD


CAD, no prior stents


HTN


Hiatial Hernia 


Hx of RAY with stent to left mild to mod per right 2017 duplex





PLAN


check TTE, lipids. cards consult. likely outpatient stress test


improved with steroids, nebs, abx


check CT abdominal given abdominal pain


continue home BP meds


med rec completed


dvt ppx: heparin sq


full code 


likely dc tomorrow











MARIE FRAUSTO MD Apr 16, 2019 11:21

## 2020-02-29 ENCOUNTER — HOSPITAL ENCOUNTER (EMERGENCY)
Dept: HOSPITAL 61 - ER | Age: 85
Discharge: HOME | End: 2020-02-29
Payer: COMMERCIAL

## 2020-02-29 VITALS — BODY MASS INDEX: 33.12 KG/M2 | HEIGHT: 64 IN | WEIGHT: 194.01 LBS

## 2020-02-29 VITALS — SYSTOLIC BLOOD PRESSURE: 189 MMHG | DIASTOLIC BLOOD PRESSURE: 96 MMHG

## 2020-02-29 DIAGNOSIS — J44.1: Primary | ICD-10-CM

## 2020-02-29 DIAGNOSIS — I10: ICD-10-CM

## 2020-02-29 DIAGNOSIS — Z95.5: ICD-10-CM

## 2020-02-29 DIAGNOSIS — Z91.041: ICD-10-CM

## 2020-02-29 DIAGNOSIS — Z88.1: ICD-10-CM

## 2020-02-29 DIAGNOSIS — Z88.8: ICD-10-CM

## 2020-02-29 LAB
ANION GAP SERPL CALC-SCNC: 9 MMOL/L (ref 6–14)
BASOPHILS # BLD AUTO: 0.1 X10^3/UL (ref 0–0.2)
BASOPHILS NFR BLD: 1 % (ref 0–3)
BUN SERPL-MCNC: 14 MG/DL (ref 7–20)
CALCIUM SERPL-MCNC: 9.6 MG/DL (ref 8.5–10.1)
CHLORIDE SERPL-SCNC: 100 MMOL/L (ref 98–107)
CO2 SERPL-SCNC: 29 MMOL/L (ref 21–32)
CREAT SERPL-MCNC: 0.9 MG/DL (ref 0.6–1)
EOSINOPHIL NFR BLD: 0.5 X10^3/UL (ref 0–0.7)
EOSINOPHIL NFR BLD: 7 % (ref 0–3)
ERYTHROCYTE [DISTWIDTH] IN BLOOD BY AUTOMATED COUNT: 13.8 % (ref 11.5–14.5)
GFR SERPLBLD BASED ON 1.73 SQ M-ARVRAT: 71.5 ML/MIN
GLUCOSE SERPL-MCNC: 89 MG/DL (ref 70–99)
HCT VFR BLD CALC: 35 % (ref 36–47)
HGB BLD-MCNC: 11.4 G/DL (ref 12–15.5)
INFLUENZA A PATIENT: NEGATIVE
INFLUENZA B PATIENT: NEGATIVE
LYMPHOCYTES # BLD: 1 X10^3/UL (ref 1–4.8)
LYMPHOCYTES NFR BLD AUTO: 14 % (ref 24–48)
MCH RBC QN AUTO: 27 PG (ref 25–35)
MCHC RBC AUTO-ENTMCNC: 33 G/DL (ref 31–37)
MCV RBC AUTO: 83 FL (ref 79–100)
MONO #: 0.5 X10^3/UL (ref 0–1.1)
MONOCYTES NFR BLD: 7 % (ref 0–9)
NEUT #: 5 X10^3/UL (ref 1.8–7.7)
NEUTROPHILS NFR BLD AUTO: 71 % (ref 31–73)
PLATELET # BLD AUTO: 232 X10^3/UL (ref 140–400)
POTASSIUM SERPL-SCNC: 3.6 MMOL/L (ref 3.5–5.1)
RBC # BLD AUTO: 4.21 X10^6/UL (ref 3.5–5.4)
SODIUM SERPL-SCNC: 138 MMOL/L (ref 136–145)
WBC # BLD AUTO: 7 X10^3/UL (ref 4–11)

## 2020-02-29 PROCEDURE — 93005 ELECTROCARDIOGRAM TRACING: CPT

## 2020-02-29 PROCEDURE — 80048 BASIC METABOLIC PNL TOTAL CA: CPT

## 2020-02-29 PROCEDURE — 94640 AIRWAY INHALATION TREATMENT: CPT

## 2020-02-29 PROCEDURE — 71046 X-RAY EXAM CHEST 2 VIEWS: CPT

## 2020-02-29 PROCEDURE — 87804 INFLUENZA ASSAY W/OPTIC: CPT

## 2020-02-29 PROCEDURE — 84484 ASSAY OF TROPONIN QUANT: CPT

## 2020-02-29 PROCEDURE — 85025 COMPLETE CBC W/AUTO DIFF WBC: CPT

## 2020-02-29 PROCEDURE — 36415 COLL VENOUS BLD VENIPUNCTURE: CPT

## 2020-02-29 NOTE — EKG
[facility]

              [address] [place] [address]

Test Date:    2020               Test Time:    11:22:56

Pat Name:     [facility]          Department:   

Patient ID:   [id number]           Room:          

Gender:       F                        Technician:   

:          [id number]               Requested By: ALYSSA FERMIN

Order Number: [id number]           Reading MD:     

                                 Measurements

Intervals                              Axis          

Rate:         91                       P:            36

WY:           168                      QRS:          -64

QRSD:         152                      T:            20

QT:           396                                    

QTc:          495                                    

                           Interpretive Statements

SINUS RHYTHM

ABNORMAL LEFT AXIS DEVIATION

S1,S2,S3 PATTERN

LEFT ANTERIOR FASCICULAR BLOCK

RIGHT BUNDLE BRANCH BLOCK

BIFASCICULAR BLOCK

RVH WITH REPOLARIZATION ABNORMALITY

ABNORMAL ECG

No previous ECG available for comparison

## 2020-02-29 NOTE — RAD
Chest PA and lateral 2/29/2020.

 

Reason for exam: Shortness of breath and cough. Comparison is made with a 

study of 4/15/2019.

 

No new infiltrate or effusion is seen. Heart size and pulmonary 

vascularity appear normal.

 

IMPRESSION: No acute disease.

 

Electronically signed by: Yifan Ga Jr., MD (2/29/2020 12:21 PM) 

[facility] [place]

## 2020-02-29 NOTE — PHYS DOC
Past Medical History


Past Medical History:  Arthritis, Asthma, COPD, Diverticulitis, GI Bleed, 

Hypertension, Pneumonia


Additional Past Medical Histor:  HERNIA, rectal bleed 08/15


Past Surgical History:  Cholecystectomy, Hysterectomy, Tubal ligation


Additional Past Surgical Histo:  heart cath, renal stents


Smoking Status:  Never Smoker


Additional Information:  


reports has COPD, was never a smoker but  smoked around her


Alcohol Use:  None


Drug Use:  None





Adult General


Chief Complaint


Chief Complaint:  SHORTNESS OF BREATH





HPI


HPI





Patient is a 88  year old female who is brought to the ER for shortness of 

breath. Patient states that she has been at  at [facility] recently and was 

most recently sent home with a prescription for albuterol nebulizer. She has a 

history of asthma and COPD. She has been coughing recently. No increase in 

oxygen demand. [facility]. No fever or chills. No chest pain.





Review of Systems


Review of Systems


All other ROS is negative unless otherwise stated in HPI





Current Medications


Current Medications





Current Medications








 Medications


  (Trade)  Dose


 Ordered  Sig/Josette  Start Time


 Stop Time Status Last Admin


Dose Admin


 


 Albuterol/


 Ipratropium


  (Duoneb)  3 ml  1X  ONCE  2/29/20 11:15


 2/29/20 11:16 DC 2/29/20 11:35


3 ML











Allergies


Allergies





Allergies








Coded Allergies Type Severity Reaction Last Updated Verified


 


  doxycycline Allergy Severe  2/29/20 Yes


 


  amlodipine Allergy Intermediate  12/11/17 Yes


 


  dexlansoprazole Allergy Intermediate  12/11/17 Yes


 


  fluticasone Allergy Intermediate Uses ADVAIR at home 1/30/15 Yes


 


  levofloxacin Allergy Intermediate  12/11/17 Yes


 


  rabeprazole Allergy Intermediate  12/11/17 Yes


 


  ranitidine Allergy Intermediate  12/11/17 Yes


 


  Statins-Hmg-Coa Reductase Inhibitor Allergy Unknown  2/29/20 Yes


 


  metoprolol Allergy Unknown  2/29/20 Yes


 


  ipratropium Adverse Reaction Unknown  2/29/20 Yes


 


  losartan Adverse Reaction Unknown  2/29/20 Yes











Physical Exam


Physical Exam


See above


Constitutional: Well developed, well nourished, no acute distress, non-toxic 

appearance. []


HENT: Normocephalic, atraumatic, bilateral external ears normal, oropharynx 

moist, no oral exudates, nose normal. []


Eyes: PERRLA, EOMI, conjunctiva normal, no discharge. [] 


Neck: Normal range of motion, no tenderness, supple, no stridor. [] 


Cardiovascular:Heart rate regular rhythm, no murmur []


Lungs & Thorax: Scattered rhonchi bilaterally


Abdomen: Bowel sounds normal, soft, no tenderness, no masses, no pulsatile 

masses. [] 


Skin: Warm, dry, no erythema, no rash. [] 


Back: No tenderness, no CVA tenderness. [] 


Extremities: No tenderness, no cyanosis, no clubbing, ROM intact, no edema. [] 


Neurologic: Alert and oriented X 3, normal motor function, normal sensory 

function, no focal deficits noted. []





Current Patient Data


Vital Signs





                                   Vital Signs








  Date Time  Temp Pulse Resp B/P (MAP) Pulse Ox O2 Delivery O2 Flow Rate FiO2


 


2/29/20 11:37     98 Room Air  


 


2/29/20 10:20 97.6 88 18 209/97 (134)    





 97.6       








Lab Values





                                Laboratory Tests








Test


 2/29/20


11:00 2/29/20


11:45


 


White Blood Count


 7.0 x10^3/uL


(4.0-11.0) 





 


Red Blood Count


 4.21 x10^6/uL


(3.50-5.40) 





 


Hemoglobin


 11.4 g/dL


(12.0-15.5)  L 





 


Hematocrit


 35.0 %


(36.0-47.0)  L 





 


Mean Corpuscular Volume


 83 fL ()


 





 


Mean Corpuscular Hemoglobin 27 pg (25-35)   


 


Mean Corpuscular Hemoglobin


Concent 33 g/dL


(31-37) 





 


Red Cell Distribution Width


 13.8 %


(11.5-14.5) 





 


Platelet Count


 232 x10^3/uL


(140-400) 





 


Neutrophils (%) (Auto) 71 % (31-73)   


 


Lymphocytes (%) (Auto) 14 % (24-48)  L 


 


Monocytes (%) (Auto) 7 % (0-9)   


 


Eosinophils (%) (Auto) 7 % (0-3)  H 


 


Basophils (%) (Auto) 1 % (0-3)   


 


Neutrophils # (Auto)


 5.0 x10^3/uL


(1.8-7.7) 





 


Lymphocytes # (Auto)


 1.0 x10^3/uL


(1.0-4.8) 





 


Monocytes # (Auto)


 0.5 x10^3/uL


(0.0-1.1) 





 


Eosinophils # (Auto)


 0.5 x10^3/uL


(0.0-0.7) 





 


Basophils # (Auto)


 0.1 x10^3/uL


(0.0-0.2) 





 


Sodium Level


 


 138 mmol/L


(136-145)


 


Potassium Level


 


 3.6 mmol/L


(3.5-5.1)


 


Chloride Level


 


 100 mmol/L


()


 


Carbon Dioxide Level


 


 29 mmol/L


(21-32)


 


Anion Gap  9 (6-14)  


 


Blood Urea Nitrogen


 


 14 mg/dL


(7-20)


 


Creatinine


 


 0.9 mg/dL


(0.6-1.0)


 


Estimated GFR


(Cockcroft-Gault) 


 71.5  





 


Glucose Level


 


 89 mg/dL


(70-99)


 


Calcium Level


 


 9.6 mg/dL


(8.5-10.1)


 


Troponin I Quantitative


 


 < 0.017 ng/mL


(0.000-0.055)


 


Influenza Type A Antigen


 


 Negative


(NEGATIVE)


 


Influenza Type B Antigen


 


 Negative


(NEGATIVE)





                                Laboratory Tests


2/29/20 11:00








                                Laboratory Tests


2/29/20 11:45











EKG


EKG


[]





Radiology/Procedures


Radiology/Procedures


Chest PA and lateral 2/29/2020.


 


Reason for exam: Shortness of breath and cough. Comparison is made with a 


study of 4/15/2019.


 


No new infiltrate or effusion is seen. Heart size and pulmonary 


vascularity appear normal.


 


IMPRESSION: No acute disease.[]





Course & Med Decision Making


Course & Med Decision Making


Pertinent Labs and Imaging studies reviewed. (See chart for details)





1117: Seen for shortness of breath that has been ongoing. No increased oxygen 

demand. We'll check labs and x-ray and give DuoNeb.





1242: Patient's workup is unremarkable today. Given her history of COPD and 

asthma we'll start her on prednisone and Zithromax and she is to follow-up with 

her primary care physician as needed. She is stable for discharge.





Dragon Disclaimer


Dragon Disclaimer


This electronic medical record was generated, in whole or in part, using a voice

 recognition dictation system.





Departure


Departure


Impression:  


   Primary Impression:  


   COPD with exacerbation


Disposition:  01 HOME, SELF-CARE


Condition:  STABLE


Referrals:  


VANESSA CONNORS (PCP)


Follow up in 3-5 days for worsening symptoms.


Patient Instructions:  Chronic Obstructive Pulmonary Disease Exacerbation





Additional Instructions:  


Please return for worsening symptoms.


Scripts


Azithromycin (ZITHROMAX) 250 Mg Tablet


250 MG PO as directed for ANTI-BIOTIC, #6 TAB 0 Refills


   Take 2 PO x 1 days


   Then take 1 PO q 24 hour for the next 4 days


   Prov: ALYSSA FERMIN DO         2/29/20 


Prednisone (PREDNISONE) 20 Mg Tablet


1 TAB PO DAILY, #5 TAB


   Prov: ALYSSA FERMIN DO         2/29/20











ALYSSA FERMIN DO               Feb 29, 2020 11:19